# Patient Record
Sex: FEMALE | Race: ASIAN | NOT HISPANIC OR LATINO
[De-identification: names, ages, dates, MRNs, and addresses within clinical notes are randomized per-mention and may not be internally consistent; named-entity substitution may affect disease eponyms.]

---

## 2022-12-21 ENCOUNTER — NON-APPOINTMENT (OUTPATIENT)
Age: 32
End: 2022-12-21

## 2024-03-04 ENCOUNTER — TRANSCRIPTION ENCOUNTER (OUTPATIENT)
Age: 34
End: 2024-03-04

## 2024-03-04 ENCOUNTER — APPOINTMENT (OUTPATIENT)
Dept: OBGYN | Facility: CLINIC | Age: 34
End: 2024-03-04
Payer: COMMERCIAL

## 2024-03-04 ENCOUNTER — ASOB RESULT (OUTPATIENT)
Age: 34
End: 2024-03-04

## 2024-03-04 VITALS
BODY MASS INDEX: 28.34 KG/M2 | RESPIRATION RATE: 16 BRPM | SYSTOLIC BLOOD PRESSURE: 116 MMHG | OXYGEN SATURATION: 97 % | DIASTOLIC BLOOD PRESSURE: 82 MMHG | WEIGHT: 166 LBS | TEMPERATURE: 98.1 F | HEART RATE: 97 BPM | HEIGHT: 64 IN

## 2024-03-04 DIAGNOSIS — E28.2 POLYCYSTIC OVARIAN SYNDROME: ICD-10-CM

## 2024-03-04 DIAGNOSIS — Z01.419 ENCOUNTER FOR GYNECOLOGICAL EXAMINATION (GENERAL) (ROUTINE) W/OUT ABNORMAL FINDINGS: ICD-10-CM

## 2024-03-04 PROBLEM — Z00.00 ENCOUNTER FOR PREVENTIVE HEALTH EXAMINATION: Status: ACTIVE | Noted: 2024-03-04

## 2024-03-04 PROCEDURE — 99385 PREV VISIT NEW AGE 18-39: CPT

## 2024-03-04 PROCEDURE — 76830 TRANSVAGINAL US NON-OB: CPT

## 2024-03-04 PROCEDURE — ZZZZZ: CPT

## 2024-03-04 NOTE — HISTORY OF PRESENT ILLNESS
[FreeTextEntry1] : ESTEFANY MATIAS 34 YO, presents for Annual G 0   LMP: 02/22/24 - Regular menses Sexually active, with same partner No Medication No family history of breast cancer. To do monthly SBE.  History of PCOS x 10 years. Prescribed Metformin but currently not using it. Patient with elevated DHEAS & Testosterone- will be seeing an Endocrinologist.  Trying to conceive for 2 years.  Has not seen any MD about it.  Partner- 38YO, Healthy & nonsmoker.  To get SA & HSG & See me for ROSEMARIE=MD Referral..  For Pelvic sonogram today.

## 2024-03-05 LAB
C TRACH RRNA SPEC QL NAA+PROBE: NOT DETECTED
N GONORRHOEA RRNA SPEC QL NAA+PROBE: NOT DETECTED
SOURCE TP AMPLIFICATION: NORMAL

## 2024-03-07 LAB — CYTOLOGY CVX/VAG DOC THIN PREP: NORMAL

## 2024-03-18 ENCOUNTER — APPOINTMENT (OUTPATIENT)
Dept: OBGYN | Facility: CLINIC | Age: 34
End: 2024-03-18
Payer: COMMERCIAL

## 2024-03-18 PROCEDURE — 99441: CPT

## 2024-10-02 ENCOUNTER — NON-APPOINTMENT (OUTPATIENT)
Age: 34
End: 2024-10-02

## 2024-12-26 ENCOUNTER — APPOINTMENT (OUTPATIENT)
Dept: OBGYN | Facility: CLINIC | Age: 34
End: 2024-12-26
Payer: COMMERCIAL

## 2024-12-26 VITALS
WEIGHT: 149 LBS | SYSTOLIC BLOOD PRESSURE: 126 MMHG | TEMPERATURE: 97.9 F | DIASTOLIC BLOOD PRESSURE: 90 MMHG | BODY MASS INDEX: 25.44 KG/M2 | OXYGEN SATURATION: 99 % | RESPIRATION RATE: 16 BRPM | HEART RATE: 114 BPM | HEIGHT: 64 IN

## 2024-12-26 DIAGNOSIS — Z32.00 ENCOUNTER FOR PREGNANCY TEST, RESULT UNKNOWN: ICD-10-CM

## 2024-12-26 PROCEDURE — 99214 OFFICE O/P EST MOD 30 MIN: CPT

## 2024-12-26 PROCEDURE — 36415 COLL VENOUS BLD VENIPUNCTURE: CPT

## 2024-12-26 RX ORDER — PNV NO.163/IRON/FOLATE NO.10 20 MG-1 MG
20-1 TABLET ORAL
Qty: 30 | Refills: 11 | Status: ACTIVE | COMMUNITY
Start: 2024-12-26 | End: 1900-01-01

## 2024-12-27 LAB
HCG SERPL-MCNC: 354 MIU/ML
PROGEST SERPL-MCNC: 17.2 NG/ML
T GONDII AB SER-IMP: NEGATIVE
T GONDII AB SER-IMP: NEGATIVE
T GONDII IGG SER QL: <3 IU/ML
T GONDII IGM SER QL: <3 AU/ML
TSH SERPL-ACNC: 1.6 UIU/ML

## 2025-01-08 PROBLEM — O36.80X0 PREGNANCY OF UNKNOWN ANATOMIC LOCATION: Status: ACTIVE | Noted: 2025-01-08 | Resolved: 2025-04-08

## 2025-01-09 ENCOUNTER — ASOB RESULT (OUTPATIENT)
Age: 35
End: 2025-01-09

## 2025-01-09 ENCOUNTER — APPOINTMENT (OUTPATIENT)
Dept: OBGYN | Facility: CLINIC | Age: 35
End: 2025-01-09
Payer: COMMERCIAL

## 2025-01-09 VITALS
WEIGHT: 153 LBS | TEMPERATURE: 98 F | HEART RATE: 98 BPM | HEIGHT: 64 IN | SYSTOLIC BLOOD PRESSURE: 116 MMHG | OXYGEN SATURATION: 98 % | DIASTOLIC BLOOD PRESSURE: 80 MMHG | BODY MASS INDEX: 26.12 KG/M2 | RESPIRATION RATE: 16 BRPM

## 2025-01-09 DIAGNOSIS — O20.0 THREATENED ABORTION: ICD-10-CM

## 2025-01-09 PROCEDURE — 99213 OFFICE O/P EST LOW 20 MIN: CPT

## 2025-01-09 PROCEDURE — 76830 TRANSVAGINAL US NON-OB: CPT

## 2025-01-13 ENCOUNTER — APPOINTMENT (OUTPATIENT)
Dept: OBGYN | Facility: CLINIC | Age: 35
End: 2025-01-13

## 2025-01-23 ENCOUNTER — APPOINTMENT (OUTPATIENT)
Dept: OBGYN | Facility: CLINIC | Age: 35
End: 2025-01-23
Payer: COMMERCIAL

## 2025-01-23 ENCOUNTER — ASOB RESULT (OUTPATIENT)
Age: 35
End: 2025-01-23

## 2025-01-23 VITALS
HEIGHT: 64 IN | SYSTOLIC BLOOD PRESSURE: 104 MMHG | TEMPERATURE: 98 F | BODY MASS INDEX: 26.12 KG/M2 | OXYGEN SATURATION: 100 % | RESPIRATION RATE: 16 BRPM | DIASTOLIC BLOOD PRESSURE: 70 MMHG | HEART RATE: 95 BPM | WEIGHT: 153 LBS

## 2025-01-23 DIAGNOSIS — E28.2 POLYCYSTIC OVARIAN SYNDROME: ICD-10-CM

## 2025-01-23 DIAGNOSIS — O24.419 GESTATIONAL DIABETES MELLITUS IN PREGNANCY, UNSPECIFIED CONTROL: ICD-10-CM

## 2025-01-23 DIAGNOSIS — Z3A.08 8 WEEKS GESTATION OF PREGNANCY: ICD-10-CM

## 2025-01-23 PROCEDURE — 76817 TRANSVAGINAL US OBSTETRIC: CPT

## 2025-01-23 PROCEDURE — 99213 OFFICE O/P EST LOW 20 MIN: CPT

## 2025-02-03 ENCOUNTER — ASOB RESULT (OUTPATIENT)
Age: 35
End: 2025-02-03

## 2025-02-03 ENCOUNTER — APPOINTMENT (OUTPATIENT)
Dept: OBGYN | Facility: CLINIC | Age: 35
End: 2025-02-03
Payer: COMMERCIAL

## 2025-02-03 VITALS
WEIGHT: 157 LBS | TEMPERATURE: 98 F | HEIGHT: 64 IN | OXYGEN SATURATION: 97 % | BODY MASS INDEX: 26.8 KG/M2 | DIASTOLIC BLOOD PRESSURE: 74 MMHG | HEART RATE: 105 BPM | SYSTOLIC BLOOD PRESSURE: 106 MMHG | RESPIRATION RATE: 16 BRPM

## 2025-02-03 DIAGNOSIS — O20.0 THREATENED ABORTION: ICD-10-CM

## 2025-02-03 PROCEDURE — 76801 OB US < 14 WKS SINGLE FETUS: CPT

## 2025-02-03 PROCEDURE — 36415 COLL VENOUS BLD VENIPUNCTURE: CPT

## 2025-02-03 PROCEDURE — 99214 OFFICE O/P EST MOD 30 MIN: CPT

## 2025-02-04 LAB
ABO + RH PNL BLD: NORMAL
BLD GP AB SCN SERPL QL: NORMAL
ESTIMATED AVERAGE GLUCOSE: 128 MG/DL
GLUCOSE SERPL-MCNC: 159 MG/DL
HBA1C MFR BLD HPLC: 6.1 %
HBV SURFACE AG SER QL: NONREACTIVE
HCT VFR BLD CALC: 37.6 %
HCV AB SER QL: NONREACTIVE
HCV S/CO RATIO: 0.1 S/CO
HGB A MFR BLD: 97.7 %
HGB A2 MFR BLD: 2.3 %
HGB BLD-MCNC: 11.8 G/DL
HGB FRACT BLD-IMP: NORMAL
HIV1+2 AB SPEC QL IA.RAPID: NONREACTIVE
MCHC RBC-ENTMCNC: 28 PG
MCHC RBC-ENTMCNC: 31.4 G/DL
MCV RBC AUTO: 89.1 FL
MEV IGG FLD QL IA: >300 AU/ML
MEV IGG+IGM SER-IMP: POSITIVE
PLATELET # BLD AUTO: 310 K/UL
RBC # BLD: 4.22 M/UL
RBC # FLD: 14.7 %
RUBV IGG FLD-ACNC: 9.61 INDEX
RUBV IGG SER-IMP: POSITIVE
T PALLIDUM AB SER QL IA: NEGATIVE
WBC # FLD AUTO: 9.84 K/UL

## 2025-02-05 LAB — BACTERIA UR CULT: NORMAL

## 2025-02-10 ENCOUNTER — APPOINTMENT (OUTPATIENT)
Dept: MATERNAL FETAL MEDICINE | Facility: CLINIC | Age: 35
End: 2025-02-10

## 2025-02-10 ENCOUNTER — ASOB RESULT (OUTPATIENT)
Age: 35
End: 2025-02-10

## 2025-02-10 LAB — FMR1 GENE MUT ANL BLD/T: NORMAL

## 2025-02-10 PROCEDURE — 99214 OFFICE O/P EST MOD 30 MIN: CPT | Mod: 95

## 2025-02-10 PROCEDURE — G0108 DIAB MANAGE TRN  PER INDIV: CPT | Mod: 95

## 2025-02-12 LAB
AR GENE MUT ANL BLD/T: NORMAL
CFTR MUT TESTED BLD/T: NEGATIVE

## 2025-02-17 ENCOUNTER — APPOINTMENT (OUTPATIENT)
Dept: OBGYN | Facility: CLINIC | Age: 35
End: 2025-02-17
Payer: COMMERCIAL

## 2025-02-17 ENCOUNTER — ASOB RESULT (OUTPATIENT)
Age: 35
End: 2025-02-17

## 2025-02-17 ENCOUNTER — LABORATORY RESULT (OUTPATIENT)
Age: 35
End: 2025-02-17

## 2025-02-17 VITALS
RESPIRATION RATE: 16 BRPM | HEIGHT: 64 IN | BODY MASS INDEX: 27.31 KG/M2 | DIASTOLIC BLOOD PRESSURE: 76 MMHG | WEIGHT: 160 LBS | HEART RATE: 100 BPM | OXYGEN SATURATION: 97 % | SYSTOLIC BLOOD PRESSURE: 116 MMHG | TEMPERATURE: 98.1 F

## 2025-02-17 DIAGNOSIS — Z34.90 ENCOUNTER FOR SUPERVISION OF NORMAL PREGNANCY, UNSPECIFIED, UNSPECIFIED TRIMESTER: ICD-10-CM

## 2025-02-17 PROCEDURE — 0502F SUBSEQUENT PRENATAL CARE: CPT

## 2025-02-17 PROCEDURE — 36415 COLL VENOUS BLD VENIPUNCTURE: CPT

## 2025-02-17 PROCEDURE — 76801 OB US < 14 WKS SINGLE FETUS: CPT

## 2025-02-17 RX ORDER — ASPIRIN 81 MG/1
81 TABLET, DELAYED RELEASE ORAL DAILY
Qty: 30 | Refills: 8 | Status: ACTIVE | COMMUNITY
Start: 2025-02-17 | End: 1900-01-01

## 2025-02-18 ENCOUNTER — APPOINTMENT (OUTPATIENT)
Dept: MATERNAL FETAL MEDICINE | Facility: CLINIC | Age: 35
End: 2025-02-18

## 2025-02-18 ENCOUNTER — ASOB RESULT (OUTPATIENT)
Age: 35
End: 2025-02-18

## 2025-02-18 DIAGNOSIS — O24.919 UNSPECIFIED DIABETES MELLITUS IN PREGNANCY, UNSPECIFIED TRIMESTER: ICD-10-CM

## 2025-02-18 PROCEDURE — 99204 OFFICE O/P NEW MOD 45 MIN: CPT | Mod: 95

## 2025-02-18 PROCEDURE — G0108 DIAB MANAGE TRN  PER INDIV: CPT | Mod: 95

## 2025-02-19 RX ORDER — PEN NEEDLE, DIABETIC 32GX 5/32"
32G X 4 MM NEEDLE, DISPOSABLE MISCELLANEOUS
Qty: 4 | Refills: 1 | Status: ACTIVE | COMMUNITY
Start: 2025-02-18 | End: 1900-01-01

## 2025-02-19 RX ORDER — INSULIN ASPART 100 [IU]/ML
100 INJECTION, SOLUTION INTRAVENOUS; SUBCUTANEOUS
Qty: 2 | Refills: 1 | Status: ACTIVE | COMMUNITY
Start: 2025-02-18 | End: 1900-01-01

## 2025-02-19 RX ORDER — INSULIN GLARGINE 100 [IU]/ML
100 INJECTION, SOLUTION SUBCUTANEOUS
Qty: 2 | Refills: 2 | Status: ACTIVE | COMMUNITY
Start: 2025-02-18 | End: 1900-01-01

## 2025-02-20 ENCOUNTER — ASOB RESULT (OUTPATIENT)
Age: 35
End: 2025-02-20

## 2025-02-20 ENCOUNTER — APPOINTMENT (OUTPATIENT)
Dept: ANTEPARTUM | Facility: CLINIC | Age: 35
End: 2025-02-20
Payer: COMMERCIAL

## 2025-02-20 PROCEDURE — 76801 OB US < 14 WKS SINGLE FETUS: CPT

## 2025-02-26 ENCOUNTER — NON-APPOINTMENT (OUTPATIENT)
Age: 35
End: 2025-02-26

## 2025-02-27 ENCOUNTER — APPOINTMENT (OUTPATIENT)
Dept: MATERNAL FETAL MEDICINE | Facility: CLINIC | Age: 35
End: 2025-02-27

## 2025-02-27 ENCOUNTER — APPOINTMENT (OUTPATIENT)
Dept: ANTEPARTUM | Facility: CLINIC | Age: 35
End: 2025-02-27
Payer: COMMERCIAL

## 2025-02-27 ENCOUNTER — ASOB RESULT (OUTPATIENT)
Age: 35
End: 2025-02-27

## 2025-02-27 PROCEDURE — 76813 OB US NUCHAL MEAS 1 GEST: CPT

## 2025-02-27 PROCEDURE — 76801 OB US < 14 WKS SINGLE FETUS: CPT

## 2025-03-06 ENCOUNTER — APPOINTMENT (OUTPATIENT)
Dept: MATERNAL FETAL MEDICINE | Facility: CLINIC | Age: 35
End: 2025-03-06

## 2025-03-06 ENCOUNTER — ASOB RESULT (OUTPATIENT)
Age: 35
End: 2025-03-06

## 2025-03-06 ENCOUNTER — APPOINTMENT (OUTPATIENT)
Dept: MATERNAL FETAL MEDICINE | Facility: CLINIC | Age: 35
End: 2025-03-06
Payer: COMMERCIAL

## 2025-03-06 PROCEDURE — 99214 OFFICE O/P EST MOD 30 MIN: CPT | Mod: 95

## 2025-03-06 PROCEDURE — G0108 DIAB MANAGE TRN  PER INDIV: CPT | Mod: 95

## 2025-03-10 ENCOUNTER — APPOINTMENT (OUTPATIENT)
Dept: OBGYN | Facility: CLINIC | Age: 35
End: 2025-03-10
Payer: COMMERCIAL

## 2025-03-10 ENCOUNTER — NON-APPOINTMENT (OUTPATIENT)
Age: 35
End: 2025-03-10

## 2025-03-10 ENCOUNTER — ASOB RESULT (OUTPATIENT)
Age: 35
End: 2025-03-10

## 2025-03-10 VITALS
BODY MASS INDEX: 28.34 KG/M2 | RESPIRATION RATE: 16 BRPM | WEIGHT: 166 LBS | HEIGHT: 64 IN | HEART RATE: 65 BPM | OXYGEN SATURATION: 99 % | TEMPERATURE: 98.1 F | DIASTOLIC BLOOD PRESSURE: 80 MMHG | SYSTOLIC BLOOD PRESSURE: 120 MMHG

## 2025-03-10 VITALS
DIASTOLIC BLOOD PRESSURE: 80 MMHG | SYSTOLIC BLOOD PRESSURE: 120 MMHG | BODY MASS INDEX: 28.34 KG/M2 | TEMPERATURE: 98.2 F | HEART RATE: 111 BPM | HEIGHT: 64 IN | OXYGEN SATURATION: 98 % | RESPIRATION RATE: 16 BRPM | WEIGHT: 166 LBS

## 2025-03-10 PROCEDURE — 76817 TRANSVAGINAL US OBSTETRIC: CPT

## 2025-03-10 PROCEDURE — 0502F SUBSEQUENT PRENATAL CARE: CPT

## 2025-03-14 ENCOUNTER — NON-APPOINTMENT (OUTPATIENT)
Age: 35
End: 2025-03-14

## 2025-03-14 ENCOUNTER — APPOINTMENT (OUTPATIENT)
Dept: OBGYN | Facility: CLINIC | Age: 35
End: 2025-03-14

## 2025-03-20 ENCOUNTER — NON-APPOINTMENT (OUTPATIENT)
Age: 35
End: 2025-03-20

## 2025-03-24 ENCOUNTER — APPOINTMENT (OUTPATIENT)
Dept: OBGYN | Facility: CLINIC | Age: 35
End: 2025-03-24
Payer: COMMERCIAL

## 2025-03-24 ENCOUNTER — APPOINTMENT (OUTPATIENT)
Dept: MATERNAL FETAL MEDICINE | Facility: CLINIC | Age: 35
End: 2025-03-24
Payer: COMMERCIAL

## 2025-03-24 VITALS
BODY MASS INDEX: 30.39 KG/M2 | HEART RATE: 99 BPM | TEMPERATURE: 98 F | WEIGHT: 178 LBS | RESPIRATION RATE: 16 BRPM | DIASTOLIC BLOOD PRESSURE: 77 MMHG | HEIGHT: 64 IN | SYSTOLIC BLOOD PRESSURE: 118 MMHG | OXYGEN SATURATION: 99 %

## 2025-03-24 DIAGNOSIS — Z34.90 ENCOUNTER FOR SUPERVISION OF NORMAL PREGNANCY, UNSPECIFIED, UNSPECIFIED TRIMESTER: ICD-10-CM

## 2025-03-24 PROCEDURE — 99214 OFFICE O/P EST MOD 30 MIN: CPT | Mod: 95

## 2025-03-24 PROCEDURE — 36415 COLL VENOUS BLD VENIPUNCTURE: CPT

## 2025-03-24 PROCEDURE — 0502F SUBSEQUENT PRENATAL CARE: CPT

## 2025-03-24 PROCEDURE — G0108 DIAB MANAGE TRN  PER INDIV: CPT | Mod: 95

## 2025-03-25 LAB
AFP INTERP SERPL-IMP: NORMAL
AFP INTERP SERPL-IMP: NORMAL
AFP MOM CUT-OFF: 1.95
AFP MOM SERPL: 1.16
AFP PERCENTILE: 67.7
AFP SERPL-ACNC: 38.13 NG/ML
CARBAMAZEPINE?: NO
CURRENT SMOKER: NO
DIABETES STATUS PATIENT: YES
GA: NORMAL
GESTATIONAL AGE METHOD: NORMAL
HX OF NTD NARR: NO
MULTIPLE PREGNANCY: NORMAL
NEURAL TUBE DEFECT RISK FETUS: NORMAL
NEURAL TUBE DEFECT RISK POP: NORMAL
RECOM F/U: NO
TEST PERFORMANCE INFO SPEC: NORMAL
VALPROIC ACID?: NO

## 2025-03-26 ENCOUNTER — TRANSCRIPTION ENCOUNTER (OUTPATIENT)
Age: 35
End: 2025-03-26

## 2025-03-27 ENCOUNTER — TRANSCRIPTION ENCOUNTER (OUTPATIENT)
Age: 35
End: 2025-03-27

## 2025-03-27 DIAGNOSIS — R00.0 TACHYCARDIA, UNSPECIFIED: ICD-10-CM

## 2025-03-27 DIAGNOSIS — R00.2 PALPITATIONS: ICD-10-CM

## 2025-03-28 ENCOUNTER — APPOINTMENT (OUTPATIENT)
Dept: ANTEPARTUM | Facility: CLINIC | Age: 35
End: 2025-03-28
Payer: COMMERCIAL

## 2025-03-28 ENCOUNTER — ASOB RESULT (OUTPATIENT)
Age: 35
End: 2025-03-28

## 2025-03-28 PROCEDURE — 76805 OB US >/= 14 WKS SNGL FETUS: CPT

## 2025-04-04 ENCOUNTER — APPOINTMENT (OUTPATIENT)
Age: 35
End: 2025-04-04

## 2025-04-09 ENCOUNTER — ASOB RESULT (OUTPATIENT)
Age: 35
End: 2025-04-09

## 2025-04-09 ENCOUNTER — APPOINTMENT (OUTPATIENT)
Dept: OBGYN | Facility: CLINIC | Age: 35
End: 2025-04-09

## 2025-04-09 ENCOUNTER — APPOINTMENT (OUTPATIENT)
Dept: MATERNAL FETAL MEDICINE | Facility: CLINIC | Age: 35
End: 2025-04-09
Payer: COMMERCIAL

## 2025-04-09 VITALS
SYSTOLIC BLOOD PRESSURE: 96 MMHG | WEIGHT: 176 LBS | OXYGEN SATURATION: 98 % | BODY MASS INDEX: 30.05 KG/M2 | HEIGHT: 64 IN | HEART RATE: 103 BPM | DIASTOLIC BLOOD PRESSURE: 68 MMHG | TEMPERATURE: 98.5 F | RESPIRATION RATE: 16 BRPM

## 2025-04-09 DIAGNOSIS — Z87.42 PERSONAL HISTORY OF OTHER DISEASES OF THE FEMALE GENITAL TRACT: ICD-10-CM

## 2025-04-09 DIAGNOSIS — G47.33 OBSTRUCTIVE SLEEP APNEA (ADULT) (PEDIATRIC): ICD-10-CM

## 2025-04-09 DIAGNOSIS — Z87.19 PERSONAL HISTORY OF OTHER DISEASES OF THE DIGESTIVE SYSTEM: ICD-10-CM

## 2025-04-09 DIAGNOSIS — K21.9 GASTRO-ESOPHAGEAL REFLUX DISEASE W/OUT ESOPHAGITIS: ICD-10-CM

## 2025-04-09 DIAGNOSIS — R73.03 PREDIABETES.: ICD-10-CM

## 2025-04-09 PROCEDURE — 0501F PRENATAL FLOW SHEET: CPT

## 2025-04-09 PROCEDURE — G0108 DIAB MANAGE TRN  PER INDIV: CPT | Mod: 95

## 2025-04-09 PROCEDURE — 81002 URINALYSIS NONAUTO W/O SCOPE: CPT

## 2025-04-09 RX ORDER — FAMOTIDINE 20 MG/1
20 TABLET, FILM COATED ORAL
Qty: 60 | Refills: 6 | Status: ACTIVE | COMMUNITY
Start: 2025-04-09 | End: 1900-01-01

## 2025-04-09 RX ORDER — BLOOD-GLUCOSE SENSOR
EACH MISCELLANEOUS
Qty: 6 | Refills: 3 | Status: ACTIVE | COMMUNITY
Start: 2025-04-09 | End: 1900-01-01

## 2025-04-11 LAB
BILIRUB UR QL STRIP: NEGATIVE
CLARITY UR: CLEAR
COLLECTION METHOD: NORMAL
GLUCOSE UR-MCNC: NEGATIVE
HCG UR QL: 0.2 EU/DL
HGB UR QL STRIP.AUTO: NEGATIVE
KETONES UR-MCNC: NEGATIVE
LEUKOCYTE ESTERASE UR QL STRIP: NEGATIVE
NITRITE UR QL STRIP: NEGATIVE
PH UR STRIP: 5.5
PROT UR STRIP-MCNC: NEGATIVE
SP GR UR STRIP: 1.02

## 2025-04-14 ENCOUNTER — APPOINTMENT (OUTPATIENT)
Dept: OBGYN | Facility: CLINIC | Age: 35
End: 2025-04-14

## 2025-04-16 ENCOUNTER — APPOINTMENT (OUTPATIENT)
Dept: OBGYN | Facility: CLINIC | Age: 35
End: 2025-04-16

## 2025-04-18 ENCOUNTER — ASOB RESULT (OUTPATIENT)
Age: 35
End: 2025-04-18

## 2025-04-18 ENCOUNTER — OUTPATIENT (OUTPATIENT)
Dept: INPATIENT UNIT | Facility: HOSPITAL | Age: 35
LOS: 1 days | End: 2025-04-18
Payer: COMMERCIAL

## 2025-04-18 ENCOUNTER — APPOINTMENT (OUTPATIENT)
Dept: ANTEPARTUM | Facility: CLINIC | Age: 35
End: 2025-04-18

## 2025-04-18 ENCOUNTER — EMERGENCY (EMERGENCY)
Facility: HOSPITAL | Age: 35
LOS: 1 days | End: 2025-04-18
Admitting: EMERGENCY MEDICINE

## 2025-04-18 VITALS
RESPIRATION RATE: 16 BRPM | DIASTOLIC BLOOD PRESSURE: 80 MMHG | SYSTOLIC BLOOD PRESSURE: 138 MMHG | HEART RATE: 128 BPM | OXYGEN SATURATION: 97 % | TEMPERATURE: 99 F

## 2025-04-18 VITALS
RESPIRATION RATE: 16 BRPM | SYSTOLIC BLOOD PRESSURE: 144 MMHG | DIASTOLIC BLOOD PRESSURE: 83 MMHG | TEMPERATURE: 99 F | OXYGEN SATURATION: 100 % | HEART RATE: 111 BPM

## 2025-04-18 VITALS — HEART RATE: 104 BPM | OXYGEN SATURATION: 85 %

## 2025-04-18 DIAGNOSIS — O26.899 OTHER SPECIFIED PREGNANCY RELATED CONDITIONS, UNSPECIFIED TRIMESTER: ICD-10-CM

## 2025-04-18 LAB
ALBUMIN SERPL ELPH-MCNC: 3.3 G/DL — SIGNIFICANT CHANGE UP (ref 3.3–5)
ALP SERPL-CCNC: 71 U/L — SIGNIFICANT CHANGE UP (ref 40–120)
ALT FLD-CCNC: 20 U/L — SIGNIFICANT CHANGE UP (ref 4–33)
ANION GAP SERPL CALC-SCNC: 10 MMOL/L — SIGNIFICANT CHANGE UP (ref 7–14)
APPEARANCE UR: CLEAR — SIGNIFICANT CHANGE UP
AST SERPL-CCNC: 17 U/L — SIGNIFICANT CHANGE UP (ref 4–32)
BASOPHILS # BLD AUTO: 0.03 K/UL — SIGNIFICANT CHANGE UP (ref 0–0.2)
BASOPHILS NFR BLD AUTO: 0.2 % — SIGNIFICANT CHANGE UP (ref 0–2)
BILIRUB SERPL-MCNC: <0.2 MG/DL — SIGNIFICANT CHANGE UP (ref 0.2–1.2)
BILIRUB UR-MCNC: NEGATIVE — SIGNIFICANT CHANGE UP
BUN SERPL-MCNC: 11 MG/DL — SIGNIFICANT CHANGE UP (ref 7–23)
CALCIUM SERPL-MCNC: 9.1 MG/DL — SIGNIFICANT CHANGE UP (ref 8.4–10.5)
CHLORIDE SERPL-SCNC: 101 MMOL/L — SIGNIFICANT CHANGE UP (ref 98–107)
CO2 SERPL-SCNC: 23 MMOL/L — SIGNIFICANT CHANGE UP (ref 22–31)
COLOR SPEC: YELLOW — SIGNIFICANT CHANGE UP
CREAT ?TM UR-MCNC: 86 MG/DL — SIGNIFICANT CHANGE UP
CREAT SERPL-MCNC: 0.41 MG/DL — LOW (ref 0.5–1.3)
DIFF PNL FLD: NEGATIVE — SIGNIFICANT CHANGE UP
EGFR: 131 ML/MIN/1.73M2 — SIGNIFICANT CHANGE UP
EGFR: 131 ML/MIN/1.73M2 — SIGNIFICANT CHANGE UP
EOSINOPHIL # BLD AUTO: 0.15 K/UL — SIGNIFICANT CHANGE UP (ref 0–0.5)
EOSINOPHIL NFR BLD AUTO: 1.2 % — SIGNIFICANT CHANGE UP (ref 0–6)
GLUCOSE SERPL-MCNC: 79 MG/DL — SIGNIFICANT CHANGE UP (ref 70–99)
GLUCOSE UR QL: >=1000 MG/DL
HCT VFR BLD CALC: 35.5 % — SIGNIFICANT CHANGE UP (ref 34.5–45)
HGB BLD-MCNC: 11.4 G/DL — LOW (ref 11.5–15.5)
IANC: 9.38 K/UL — HIGH (ref 1.8–7.4)
IMM GRANULOCYTES NFR BLD AUTO: 1 % — HIGH (ref 0–0.9)
KETONES UR-MCNC: NEGATIVE MG/DL — SIGNIFICANT CHANGE UP
LDH SERPL L TO P-CCNC: 166 U/L — SIGNIFICANT CHANGE UP (ref 135–225)
LEUKOCYTE ESTERASE UR-ACNC: NEGATIVE — SIGNIFICANT CHANGE UP
LYMPHOCYTES # BLD AUTO: 16.1 % — SIGNIFICANT CHANGE UP (ref 13–44)
LYMPHOCYTES # BLD AUTO: 2.05 K/UL — SIGNIFICANT CHANGE UP (ref 1–3.3)
MCHC RBC-ENTMCNC: 28.4 PG — SIGNIFICANT CHANGE UP (ref 27–34)
MCHC RBC-ENTMCNC: 32.1 G/DL — SIGNIFICANT CHANGE UP (ref 32–36)
MCV RBC AUTO: 88.5 FL — SIGNIFICANT CHANGE UP (ref 80–100)
MONOCYTES # BLD AUTO: 0.96 K/UL — HIGH (ref 0–0.9)
MONOCYTES NFR BLD AUTO: 7.6 % — SIGNIFICANT CHANGE UP (ref 2–14)
NEUTROPHILS # BLD AUTO: 9.38 K/UL — HIGH (ref 1.8–7.4)
NEUTROPHILS NFR BLD AUTO: 73.9 % — SIGNIFICANT CHANGE UP (ref 43–77)
NITRITE UR-MCNC: NEGATIVE — SIGNIFICANT CHANGE UP
NRBC # BLD AUTO: 0 K/UL — SIGNIFICANT CHANGE UP (ref 0–0)
NRBC # FLD: 0 K/UL — SIGNIFICANT CHANGE UP (ref 0–0)
NRBC BLD AUTO-RTO: 0 /100 WBCS — SIGNIFICANT CHANGE UP (ref 0–0)
PH UR: 7 — SIGNIFICANT CHANGE UP (ref 5–8)
PLATELET # BLD AUTO: 295 K/UL — SIGNIFICANT CHANGE UP (ref 150–400)
POTASSIUM SERPL-MCNC: 3.9 MMOL/L — SIGNIFICANT CHANGE UP (ref 3.5–5.3)
POTASSIUM SERPL-SCNC: 3.9 MMOL/L — SIGNIFICANT CHANGE UP (ref 3.5–5.3)
PROT ?TM UR-MCNC: 8 MG/DL — SIGNIFICANT CHANGE UP
PROT SERPL-MCNC: 7.2 G/DL — SIGNIFICANT CHANGE UP (ref 6–8.3)
PROT UR-MCNC: NEGATIVE MG/DL — SIGNIFICANT CHANGE UP
PROT/CREAT UR-RTO: 0.1 RATIO — SIGNIFICANT CHANGE UP (ref 0–0.2)
RBC # BLD: 4.01 M/UL — SIGNIFICANT CHANGE UP (ref 3.8–5.2)
RBC # FLD: 13.6 % — SIGNIFICANT CHANGE UP (ref 10.3–14.5)
SODIUM SERPL-SCNC: 134 MMOL/L — LOW (ref 135–145)
SP GR SPEC: 1.02 — SIGNIFICANT CHANGE UP (ref 1–1.03)
URATE SERPL-MCNC: 3.4 MG/DL — SIGNIFICANT CHANGE UP (ref 2.5–7)
UROBILINOGEN FLD QL: 0.2 MG/DL — SIGNIFICANT CHANGE UP (ref 0.2–1)
WBC # BLD: 12.7 K/UL — HIGH (ref 3.8–10.5)
WBC # FLD AUTO: 12.7 K/UL — HIGH (ref 3.8–10.5)

## 2025-04-18 PROCEDURE — 76815 OB US LIMITED FETUS(S): CPT | Mod: 26

## 2025-04-18 PROCEDURE — L9996: CPT

## 2025-04-18 PROCEDURE — 93010 ELECTROCARDIOGRAM REPORT: CPT

## 2025-04-18 PROCEDURE — 93010 ELECTROCARDIOGRAM REPORT: CPT | Mod: 76

## 2025-04-18 RX ORDER — PRENATAL 136/IRON/FOLIC ACID 27 MG-1 MG
0 TABLET ORAL
Refills: 0 | DISCHARGE

## 2025-04-18 RX ORDER — ACETAMINOPHEN 500 MG/5ML
1000 LIQUID (ML) ORAL ONCE
Refills: 0 | Status: COMPLETED | OUTPATIENT
Start: 2025-04-18 | End: 2025-04-18

## 2025-04-18 RX ORDER — INSULIN ASPART 100 [IU]/ML
22 INJECTION, SOLUTION INTRAVENOUS; SUBCUTANEOUS
Refills: 0 | DISCHARGE

## 2025-04-18 RX ORDER — INSULIN GLARGINE-YFGN 100 [IU]/ML
40 INJECTION, SOLUTION SUBCUTANEOUS
Refills: 0 | DISCHARGE

## 2025-04-18 RX ADMIN — Medication 400 MILLIGRAM(S): at 13:03

## 2025-04-18 NOTE — OB PROVIDER TRIAGE NOTE - ADDITIONAL INSTRUCTIONS
Return for contractions, leaking of fluid, vaginal bleeding, decreased fetal movement  Monitor BP daily, keep log and bring to ob appointment   Start 24 hour urine   oral hydration encouraged  follow up with OB at next scheduled appointment

## 2025-04-18 NOTE — OB RN TRIAGE NOTE - TERM DELIVERIES, OB PROFILE
0
MSK: atraumatic right fourth digit, diffuse swelling along the right fourth digit, with swelling, TTP along the entire right fourth digit

## 2025-04-18 NOTE — OB PROVIDER TRIAGE NOTE - PLAN OF CARE
Fetal and maternal status reassuring  no evidence of PEC At this time   HELLP WNL   Patient headache resolved with Tylenol  Discussed with Dr. Wright   -Patient to monitor bp daily   -To start 24 hour urine     PTL precautions reviewed  PEC signs and symptoms reviewed  oral hydration encouraged  follow up with Dr. Bojorquez at next scheduled appointment     discharged at: Fetal and maternal status reassuring  no evidence of PEC At this time   HELLP WNL   Patient headache resolved with Tylenol  Discussed with Dr. Wright   -Patient to monitor bp daily   -To start 24 hour urine     PTL precautions reviewed  PEC signs and symptoms reviewed  oral hydration encouraged  follow up with Dr. Bojorquez at next scheduled appointment     discharged at: [id number]

## 2025-04-18 NOTE — OB RN TRIAGE NOTE - NSICDXPASTMEDICALHX_GEN_ALL_CORE_FT
PAST MEDICAL HISTORY:  Bulging of cervical intervertebral disc     Diabetes type 2     Fibroid, uterine     GERD (gastroesophageal reflux disease)     H/O sinus tachycardia     PCOS (polycystic ovarian syndrome)

## 2025-04-18 NOTE — OB PROVIDER TRIAGE NOTE - NSOBPROVIDERNOTE_OBGYN_ALL_OB_FT
[facility] labs ordered  EKG for palpitations  IV tylenol for headache  bp monitoring in progress  will continue to monitor HELLP labs ordered  EKG for palpitations  IV tylenol for headache  bp monitoring in progress  will continue to monitor    1353: Fetal and maternal status reassuring   Patient states her headache has resolved with tylenol  -HELLP WNL  -EKG normal sinus rhythm   -Patient discussed with Dr. Wright, approved to be discharged home at this time

## 2025-04-18 NOTE — OB PROVIDER TRIAGE NOTE - HISTORY OF PRESENT ILLNESS
PNC: Dr. Bojorquez     PNC: Dr. Bojorquez    34 year old  at 20.4 presents to triage with complaints of a pulsating shooting headache in the back of her head that started this am. Patient reports having a headache that started last night and was progressively worse this am. Patient also reports having dizziness and blurry vision with the headache. Currently states the headache is 6/10 and is not as intense as it was this am. Patient states that she took her bp this am and it was 140/91 and 136/87, denies any history of elevated bp. Patient has history of type 2DM on insulin, reports having tachycardia throughout pregnancy and is scheduled with ob cardiology in May, reports feeling palpitations this am.   -Denies contractions and cramping, leaking of fluid, vaginal bleeding, reports feeling fetal movement but states she felt less movement today.  PNC: Dr. Bojorquez    34 year old  at 20.4 presents to triage with complaints of a pulsating shooting headache in the back of her head that started this am. Patient reports having a headache that started last night and was progressively worse this am. Patient also reports having dizziness and blurry vision with the headache. Currently states the headache is 6/10 and is not as intense as it was this am. Patient states that she took her bp this am and it was 140/91 and 136/87, denies any history of elevated bp. Also reports having bilateral ankle swelling. Patient has history of type 2DM on insulin, reports having tachycardia throughout pregnancy and is scheduled with ob cardiology in May, reports feeling palpitations this am.   -Denies contractions and cramping, leaking of fluid, vaginal bleeding, reports feeling fetal movement but states she felt less movement today.   -Hx: Migraines in past no aura  PNC: Dr. Bojorquez    34 year old  at 20.4 presents to triage with complaints of a pulsating shooting headache in the back of her head that started this am. Patient reports having a headache that started last night and was progressively worse this am. Patient also reports having dizziness and blurry vision with the headache. Currently states the headache is 6/10 and is not as intense as it was this am. Patient states that she took her bp this am and it was 140/91 and 136/87, denies any history of elevated bp. Also reports having bilateral ankle swelling. Patient has history of type 2DM on insulin, reports having tachycardia throughout pregnancy and is scheduled with ob cardiology in May, reports feeling palpitations this am.   -Denies contractions and cramping, leaking of fluid, vaginal bleeding, reports feeling fetal movement but states she felt less movement today.     -Hx: Migraines in past no aura, Type 2DM, GERD, Stomach Ulcer, tachycardia, Anxiety  -MedS: Glargine insulin 40u QHS, Novolog 22-24units with meals, PNV, Pepcid

## 2025-04-18 NOTE — ED ADULT TRIAGE NOTE - CHIEF COMPLAINT QUOTE
pt ambulatory, 20 weeks 5 days pregnant, , GARCÍA 2025 follows with Dr. Bojorquez - c/o elevated BP at home with lower extremity swelling and headaches. +fetal movement. denies vaginal bleeding. FS: 171. hx: tachycardia, DM2  Per Marjan in L&D pt to be sent to L&D for further evaluation

## 2025-04-18 NOTE — OB PROVIDER TRIAGE NOTE - NSPRIMARYCAREPROV_OBGYN_ALL_OB
Patient reminded he is not sterile until he turns in negative sperm count in 6 AND 12 weeks  Patient to continue method of birth control until cleared  MD//MICHAELA/RORO

## 2025-04-18 NOTE — OB PROVIDER TRIAGE NOTE - NSHPPHYSICALEXAM_GEN_ALL_CORE
Vital Signs Last 24 Hrs  T(C): 37.1 (18 Apr 2025 11:23), Max: 37.4 (18 Apr 2025 10:13)  T(F): 98.8 (18 Apr 2025 11:23), Max: 99.3 (18 Apr 2025 10:13)  HR: 94 (18 Apr 2025 13:21) (94 - 128)  BP: 93/58 (18 Apr 2025 13:12) (93/58 - 144/83)  BP(mean): --  RR: 16 (18 Apr 2025 11:23) (16 - 16)  SpO2: 95% (18 Apr 2025 13:21) (92% - 100%)    Assessment reveals VSS  General: Female sitting comfortably in no apparent distress  Neuro: No facial asymmetry, no slurred speech, moves all 4 extremities  Mood: Alert and lucid, appropriate mood and affect  A&Ox3  Lungs- clear bilateral  Heart- normal rate and regular rhythm  Extremities- Warm, Dry, no edema present, good pulses   Abdomen soft, NT, gravid  Transabdominal Ultrasound-breech, anterior placenta, m mode 147, MVP 5.70, good fetal movement noted- images saved ASOB

## 2025-04-18 NOTE — OB RN TRIAGE NOTE - BP NONINVASIVE DIASTOLIC (MM HG)
----- Message from Barry Warren sent at 7/5/2024 11:52 AM CDT -----  Regarding: Instructions  Contact: [phone]  Abner from [facility]  calling to speak with someone in provider office regarding instructions for levothyroxine (SYNTHROID) 137 MCG Tab tablet . Please call back at  [phone]   83

## 2025-04-18 NOTE — OB RN TRIAGE NOTE - NSOBDISCHARGEVS_OBGYN_ALL_OB
Airway patent, Nasal mucosa clear. Mouth with normal mucosa. Throat has no vesicles, no oropharyngeal exudates and uvula is midline. Confirmed that patient received an additional set of vital signs prior to discharge.

## 2025-04-18 NOTE — OB PROVIDER TRIAGE NOTE - NS_OBGYNHISTORY_OBGYN_ALL_OB_FT
denies AP course complicated by type 2DM   denies AP course complicated by type 2DM   OB: denies  GYN:

## 2025-04-18 NOTE — OB PROVIDER TRIAGE NOTE - NSGENETICTESTING_OBGYN_ALL_OB
Yes [Alert] : alert [Normocephalic] : normocephalic [Flat Open Anterior Delaplane] : flat open anterior fontanelle [PERRL] : PERRL [Red Reflex Bilateral] : red reflex bilateral [Normally Placed Ears] : normally placed ears [Auricles Well Formed] : auricles well formed [Clear Tympanic membranes] : clear tympanic membranes [Light reflex present] : light reflex present [Bony landmarks visible] : bony landmarks visible [Nares Patent] : nares patent [Palate Intact] : palate intact [Uvula Midline] : uvula midline [Supple, full passive range of motion] : supple, full passive range of motion [Symmetric Chest Rise] : symmetric chest rise [Clear to Auscultation Bilaterally] : clear to auscultation bilaterally [Regular Rate and Rhythm] : regular rate and rhythm [S1, S2 present] : S1, S2 present [+2 Femoral Pulses] : +2 femoral pulses [Soft] : soft [Bowel Sounds] : bowel sounds present [Normal external genitailia] : normal external genitalia [Patent Vagina] : vagina patent [Normally Placed] : normally placed [No Abnormal Lymph Nodes Palpated] : no abnormal lymph nodes palpated [Symmetric Flexed Extremities] : symmetric flexed extremities [Startle Reflex] : startle reflex present [Suck Reflex] : suck reflex present [Rooting] : rooting reflex present [Palmar Grasp] : palmar grasp reflex present [Plantar Grasp] : plantar grasp reflex present [Symmetric Brynn] : symmetric [place] [Acute Distress] : no acute distress [Discharge] : no discharge [Palpable Masses] : no palpable masses [Murmurs] : no murmurs [Tender] : nontender [Distended] : not distended [Hepatomegaly] : no hepatomegaly [Splenomegaly] : no splenomegaly [Clitoromegaly] : no clitoromegaly [Acldwell-Ortolani] : negative Caldwell-Ortolani [Spinal Dimple] : no spinal dimple [Tuft of Hair] : no tuft of hair [Rash and/or lesion present] : no rash/lesion

## 2025-04-18 NOTE — OB PROVIDER TRIAGE NOTE - NSICDXPASTMEDICALHX_GEN_ALL_CORE_FT
PAST MEDICAL HISTORY:  Bulging of cervical intervertebral disc     Diabetes type 2     Fibroid, uterine     GERD (gastroesophageal reflux disease)     H/O sinus tachycardia     PCOS (polycystic ovarian syndrome)      PAST MEDICAL HISTORY:  Bulging of cervical intervertebral disc     Diabetes type 2     Fibroid, uterine     GERD (gastroesophageal reflux disease)     H/O sinus tachycardia     Migraines     PCOS (polycystic ovarian syndrome)

## 2025-04-18 NOTE — OB RN TRIAGE NOTE - CHIEF COMPLAINT QUOTE
sent from ED, presented with complaint of ankle swelling, HA 9/10, blurred vision, heart flutters, and elevated BP. Pt checked BP at home 140/95

## 2025-04-21 ENCOUNTER — NON-APPOINTMENT (OUTPATIENT)
Age: 35
End: 2025-04-21

## 2025-04-21 DIAGNOSIS — Z3A.20 20 WEEKS GESTATION OF PREGNANCY: ICD-10-CM

## 2025-04-21 DIAGNOSIS — O99.342 OTHER MENTAL DISORDERS COMPLICATING PREGNANCY, SECOND TRIMESTER: ICD-10-CM

## 2025-04-21 DIAGNOSIS — O26.892 OTHER SPECIFIED PREGNANCY RELATED CONDITIONS, SECOND TRIMESTER: ICD-10-CM

## 2025-04-21 DIAGNOSIS — M79.89 OTHER SPECIFIED SOFT TISSUE DISORDERS: ICD-10-CM

## 2025-04-21 DIAGNOSIS — R00.0 TACHYCARDIA, UNSPECIFIED: ICD-10-CM

## 2025-04-21 DIAGNOSIS — R42 DIZZINESS AND GIDDINESS: ICD-10-CM

## 2025-04-21 DIAGNOSIS — O24.112 PRE-EXISTING TYPE 2 DIABETES MELLITUS, IN PREGNANCY, SECOND TRIMESTER: ICD-10-CM

## 2025-04-21 DIAGNOSIS — O99.352 DISEASES OF THE NERVOUS SYSTEM COMPLICATING PREGNANCY, SECOND TRIMESTER: ICD-10-CM

## 2025-04-21 DIAGNOSIS — G43.909 MIGRAINE, UNSPECIFIED, NOT INTRACTABLE, WITHOUT STATUS MIGRAINOSUS: ICD-10-CM

## 2025-04-21 DIAGNOSIS — O99.891 OTHER SPECIFIED DISEASES AND CONDITIONS COMPLICATING PREGNANCY: ICD-10-CM

## 2025-04-21 DIAGNOSIS — H53.8 OTHER VISUAL DISTURBANCES: ICD-10-CM

## 2025-04-21 DIAGNOSIS — D25.9 LEIOMYOMA OF UTERUS, UNSPECIFIED: ICD-10-CM

## 2025-04-21 DIAGNOSIS — O99.282 ENDOCRINE, NUTRITIONAL AND METABOLIC DISEASES COMPLICATING PREGNANCY, SECOND TRIMESTER: ICD-10-CM

## 2025-04-21 DIAGNOSIS — E28.2 POLYCYSTIC OVARIAN SYNDROME: ICD-10-CM

## 2025-04-21 DIAGNOSIS — O34.12 MATERNAL CARE FOR BENIGN TUMOR OF CORPUS UTERI, SECOND TRIMESTER: ICD-10-CM

## 2025-04-21 DIAGNOSIS — R51.9 HEADACHE, UNSPECIFIED: ICD-10-CM

## 2025-04-21 DIAGNOSIS — O09.512 SUPERVISION OF ELDERLY PRIMIGRAVIDA, SECOND TRIMESTER: ICD-10-CM

## 2025-04-22 ENCOUNTER — NON-APPOINTMENT (OUTPATIENT)
Age: 35
End: 2025-04-22

## 2025-04-22 ENCOUNTER — APPOINTMENT (OUTPATIENT)
Dept: CARDIOLOGY | Facility: CLINIC | Age: 35
End: 2025-04-22
Payer: COMMERCIAL

## 2025-04-22 ENCOUNTER — APPOINTMENT (OUTPATIENT)
Dept: OBGYN | Facility: CLINIC | Age: 35
End: 2025-04-22
Payer: COMMERCIAL

## 2025-04-22 VITALS
HEIGHT: 64 IN | WEIGHT: 181 LBS | HEART RATE: 111 BPM | DIASTOLIC BLOOD PRESSURE: 78 MMHG | SYSTOLIC BLOOD PRESSURE: 113 MMHG | BODY MASS INDEX: 30.9 KG/M2

## 2025-04-22 VITALS
HEART RATE: 104 BPM | WEIGHT: 182 LBS | SYSTOLIC BLOOD PRESSURE: 118 MMHG | TEMPERATURE: 98.5 F | OXYGEN SATURATION: 98 % | DIASTOLIC BLOOD PRESSURE: 82 MMHG | HEIGHT: 64 IN | BODY MASS INDEX: 31.07 KG/M2

## 2025-04-22 DIAGNOSIS — Z79.4 TYPE 2 DIABETES MELLITUS W/OUT COMPLICATIONS: ICD-10-CM

## 2025-04-22 DIAGNOSIS — Z83.3 FAMILY HISTORY OF DIABETES MELLITUS: ICD-10-CM

## 2025-04-22 DIAGNOSIS — Z82.49 FAMILY HISTORY OF ISCHEMIC HEART DISEASE AND OTHER DISEASES OF THE CIRCULATORY SYSTEM: ICD-10-CM

## 2025-04-22 DIAGNOSIS — R00.2 PALPITATIONS: ICD-10-CM

## 2025-04-22 DIAGNOSIS — Z83.438 FAMILY HISTORY OF OTHER DISORDER OF LIPOPROTEIN METABOLISM AND OTHER LIPIDEMIA: ICD-10-CM

## 2025-04-22 DIAGNOSIS — O13.2 GESTATIONAL [PREGNANCY-INDUCED] HYPERTENSION W/OUT SIGNIFICANT PROTEINURIA, SECOND TRIMESTER: ICD-10-CM

## 2025-04-22 DIAGNOSIS — E11.9 TYPE 2 DIABETES MELLITUS W/OUT COMPLICATIONS: ICD-10-CM

## 2025-04-22 PROBLEM — M50.30 OTHER CERVICAL DISC DEGENERATION, UNSPECIFIED CERVICAL REGION: Chronic | Status: ACTIVE | Noted: 2025-04-18

## 2025-04-22 PROBLEM — Z86.79 PERSONAL HISTORY OF OTHER DISEASES OF THE CIRCULATORY SYSTEM: Chronic | Status: ACTIVE | Noted: 2025-04-18

## 2025-04-22 PROCEDURE — 93246 EXT ECG>7D<15D RECORDING: CPT

## 2025-04-22 PROCEDURE — 0500F INITIAL PRENATAL CARE VISIT: CPT

## 2025-04-22 PROCEDURE — 99214 OFFICE O/P EST MOD 30 MIN: CPT

## 2025-04-22 PROCEDURE — 99205 OFFICE O/P NEW HI 60 MIN: CPT

## 2025-04-22 PROCEDURE — 93000 ELECTROCARDIOGRAM COMPLETE: CPT | Mod: 59

## 2025-04-22 RX ORDER — PRENATAL VIT 49/IRON FUM/FOLIC 6.75-0.2MG
TABLET ORAL
Refills: 0 | Status: ACTIVE | COMMUNITY

## 2025-04-23 LAB
CREAT 24H UR-MCNC: 0.8 G/24 H
CREAT ?TM UR-MCNC: 60 MG/DL
PROT 24H UR-MRATE: 6 MG/DL
PROT ?TM UR-MCNC: 24 HR
PROT UR-MCNC: 84 MG/24 H
SPECIMEN VOL 24H UR: 1400 ML

## 2025-04-25 ENCOUNTER — INPATIENT (INPATIENT)
Facility: HOSPITAL | Age: 35
LOS: 1 days | Discharge: HOME | End: 2025-04-27
Attending: OBSTETRICS & GYNECOLOGY | Admitting: OBSTETRICS & GYNECOLOGY
Payer: COMMERCIAL

## 2025-04-25 ENCOUNTER — APPOINTMENT (OUTPATIENT)
Dept: ANTEPARTUM | Facility: CLINIC | Age: 35
End: 2025-04-25

## 2025-04-25 ENCOUNTER — APPOINTMENT (OUTPATIENT)
Dept: ANTEPARTUM | Facility: CLINIC | Age: 35
End: 2025-04-25
Payer: COMMERCIAL

## 2025-04-25 ENCOUNTER — ASOB RESULT (OUTPATIENT)
Age: 35
End: 2025-04-25

## 2025-04-25 VITALS — OXYGEN SATURATION: 98 % | SYSTOLIC BLOOD PRESSURE: 109 MMHG | DIASTOLIC BLOOD PRESSURE: 72 MMHG | HEART RATE: 108 BPM

## 2025-04-25 DIAGNOSIS — O26.899 OTHER SPECIFIED PREGNANCY RELATED CONDITIONS, UNSPECIFIED TRIMESTER: ICD-10-CM

## 2025-04-25 LAB — GLUCOSE BLDC GLUCOMTR-MCNC: 126 MG/DL — HIGH (ref 70–99)

## 2025-04-25 PROCEDURE — 76811 OB US DETAILED SNGL FETUS: CPT

## 2025-04-25 PROCEDURE — 99215 OFFICE O/P EST HI 40 MIN: CPT | Mod: 25

## 2025-04-25 PROCEDURE — 76817 TRANSVAGINAL US OBSTETRIC: CPT

## 2025-04-25 RX ORDER — DEXTROSE 50 % IN WATER 50 %
25 SYRINGE (ML) INTRAVENOUS ONCE
Refills: 0 | Status: DISCONTINUED | OUTPATIENT
Start: 2025-04-25 | End: 2025-04-27

## 2025-04-25 RX ORDER — INSULIN GLARGINE-YFGN 100 [IU]/ML
20 INJECTION, SOLUTION SUBCUTANEOUS AT BEDTIME
Refills: 0 | Status: DISCONTINUED | OUTPATIENT
Start: 2025-04-25 | End: 2025-04-26

## 2025-04-25 RX ORDER — DEXTROSE 50 % IN WATER 50 %
15 SYRINGE (ML) INTRAVENOUS ONCE
Refills: 0 | Status: DISCONTINUED | OUTPATIENT
Start: 2025-04-25 | End: 2025-04-27

## 2025-04-25 RX ORDER — PRENATAL 136/IRON/FOLIC ACID 27 MG-1 MG
1 TABLET ORAL DAILY
Refills: 0 | Status: DISCONTINUED | OUTPATIENT
Start: 2025-04-25 | End: 2025-04-27

## 2025-04-25 RX ORDER — INSULIN LISPRO 100 U/ML
22 INJECTION, SOLUTION INTRAVENOUS; SUBCUTANEOUS
Refills: 0 | Status: DISCONTINUED | OUTPATIENT
Start: 2025-04-25 | End: 2025-04-26

## 2025-04-25 RX ORDER — INSULIN GLARGINE-YFGN 100 [IU]/ML
40 INJECTION, SOLUTION SUBCUTANEOUS AT BEDTIME
Refills: 0 | Status: DISCONTINUED | OUTPATIENT
Start: 2025-04-25 | End: 2025-04-25

## 2025-04-25 RX ORDER — SODIUM CHLORIDE 9 G/1000ML
1000 INJECTION, SOLUTION INTRAVENOUS
Refills: 0 | Status: DISCONTINUED | OUTPATIENT
Start: 2025-04-25 | End: 2025-04-27

## 2025-04-25 RX ORDER — DEXTROSE 50 % IN WATER 50 %
12.5 SYRINGE (ML) INTRAVENOUS ONCE
Refills: 0 | Status: DISCONTINUED | OUTPATIENT
Start: 2025-04-25 | End: 2025-04-27

## 2025-04-25 RX ORDER — GLUCAGON 3 MG/1
1 POWDER NASAL ONCE
Refills: 0 | Status: DISCONTINUED | OUTPATIENT
Start: 2025-04-25 | End: 2025-04-27

## 2025-04-25 NOTE — OB RN PATIENT PROFILE - FALL HARM RISK - UNIVERSAL INTERVENTIONS
Bed in lowest position, wheels locked, appropriate side rails in place/Call bell, personal items and telephone in reach/Instruct patient to call for assistance before getting out of bed or chair/Non-slip footwear when patient is out of bed/[place] to call system/Physically safe environment - no spills, clutter or unnecessary equipment/Purposeful Proactive Rounding/Room/bathroom lighting operational, light cord in reach

## 2025-04-25 NOTE — OB RN PATIENT PROFILE - NS_ADMITVITALS_OBGYN_ALL_OB_DT
Pt VSS, no acute distress noted throughout shift. Pt tolerating regular diet well, no reports of n/v or diarrhea noted. Adequate output. 20 gauge PIV to right forearm and 20 gauge PIV to left anterior arm CDI and saline locked. Parents at bedside attentive to pt and active in care, verbalized understanding of POC. Safety maintained.    25-Apr-2025 23:11

## 2025-04-26 ENCOUNTER — TRANSCRIPTION ENCOUNTER (OUTPATIENT)
Age: 35
End: 2025-04-26

## 2025-04-26 VITALS
SYSTOLIC BLOOD PRESSURE: 116 MMHG | RESPIRATION RATE: 26 BRPM | HEART RATE: 117 BPM | OXYGEN SATURATION: 98 % | DIASTOLIC BLOOD PRESSURE: 74 MMHG

## 2025-04-26 DIAGNOSIS — O34.30 MATERNAL CARE FOR CERVICAL INCOMPETENCE, UNSPECIFIED TRIMESTER: ICD-10-CM

## 2025-04-26 PROBLEM — D25.9 LEIOMYOMA OF UTERUS, UNSPECIFIED: Chronic | Status: ACTIVE | Noted: 2025-04-18

## 2025-04-26 PROBLEM — G43.909 MIGRAINE, UNSPECIFIED, NOT INTRACTABLE, WITHOUT STATUS MIGRAINOSUS: Chronic | Status: ACTIVE | Noted: 2025-04-18

## 2025-04-26 PROBLEM — K21.9 GASTRO-ESOPHAGEAL REFLUX DISEASE WITHOUT ESOPHAGITIS: Chronic | Status: ACTIVE | Noted: 2025-04-18

## 2025-04-26 PROBLEM — E11.9 TYPE 2 DIABETES MELLITUS WITHOUT COMPLICATIONS: Chronic | Status: ACTIVE | Noted: 2025-04-18

## 2025-04-26 LAB
APPEARANCE UR: CLEAR — SIGNIFICANT CHANGE UP
BASOPHILS # BLD AUTO: 0.02 K/UL — SIGNIFICANT CHANGE UP (ref 0–0.2)
BASOPHILS NFR BLD AUTO: 0.2 % — SIGNIFICANT CHANGE UP (ref 0–2)
BILIRUB UR-MCNC: NEGATIVE — SIGNIFICANT CHANGE UP
BLD GP AB SCN SERPL QL: NEGATIVE — SIGNIFICANT CHANGE UP
COLOR SPEC: YELLOW — SIGNIFICANT CHANGE UP
DIFF PNL FLD: NEGATIVE — SIGNIFICANT CHANGE UP
EOSINOPHIL # BLD AUTO: 0.15 K/UL — SIGNIFICANT CHANGE UP (ref 0–0.5)
EOSINOPHIL NFR BLD AUTO: 1.2 % — SIGNIFICANT CHANGE UP (ref 0–6)
GLUCOSE BLDC GLUCOMTR-MCNC: 101 MG/DL — HIGH (ref 70–99)
GLUCOSE BLDC GLUCOMTR-MCNC: 104 MG/DL — HIGH (ref 70–99)
GLUCOSE BLDC GLUCOMTR-MCNC: 113 MG/DL — HIGH (ref 70–99)
GLUCOSE BLDC GLUCOMTR-MCNC: 117 MG/DL — HIGH (ref 70–99)
GLUCOSE BLDC GLUCOMTR-MCNC: 140 MG/DL — HIGH (ref 70–99)
GLUCOSE BLDC GLUCOMTR-MCNC: 141 MG/DL — HIGH (ref 70–99)
GLUCOSE BLDC GLUCOMTR-MCNC: 92 MG/DL — SIGNIFICANT CHANGE UP (ref 70–99)
GLUCOSE UR QL: 100 MG/DL
HCT VFR BLD CALC: 33 % — LOW (ref 34.5–45)
HGB BLD-MCNC: 10.6 G/DL — LOW (ref 11.5–15.5)
IANC: 8.4 K/UL — HIGH (ref 1.8–7.4)
IMM GRANULOCYTES NFR BLD AUTO: 0.7 % — SIGNIFICANT CHANGE UP (ref 0–0.9)
KETONES UR-MCNC: NEGATIVE MG/DL — SIGNIFICANT CHANGE UP
LEUKOCYTE ESTERASE UR-ACNC: NEGATIVE — SIGNIFICANT CHANGE UP
LYMPHOCYTES # BLD AUTO: 2.54 K/UL — SIGNIFICANT CHANGE UP (ref 1–3.3)
LYMPHOCYTES # BLD AUTO: 20.9 % — SIGNIFICANT CHANGE UP (ref 13–44)
MCHC RBC-ENTMCNC: 28.3 PG — SIGNIFICANT CHANGE UP (ref 27–34)
MCHC RBC-ENTMCNC: 32.1 G/DL — SIGNIFICANT CHANGE UP (ref 32–36)
MCV RBC AUTO: 88 FL — SIGNIFICANT CHANGE UP (ref 80–100)
MONOCYTES # BLD AUTO: 0.95 K/UL — HIGH (ref 0–0.9)
MONOCYTES NFR BLD AUTO: 7.8 % — SIGNIFICANT CHANGE UP (ref 2–14)
NEUTROPHILS # BLD AUTO: 8.4 K/UL — HIGH (ref 1.8–7.4)
NEUTROPHILS NFR BLD AUTO: 69.2 % — SIGNIFICANT CHANGE UP (ref 43–77)
NITRITE UR-MCNC: NEGATIVE — SIGNIFICANT CHANGE UP
NRBC # BLD AUTO: 0 K/UL — SIGNIFICANT CHANGE UP (ref 0–0)
NRBC # FLD: 0 K/UL — SIGNIFICANT CHANGE UP (ref 0–0)
NRBC BLD AUTO-RTO: 0 /100 WBCS — SIGNIFICANT CHANGE UP (ref 0–0)
PH UR: 6.5 — SIGNIFICANT CHANGE UP (ref 5–8)
PLATELET # BLD AUTO: 269 K/UL — SIGNIFICANT CHANGE UP (ref 150–400)
PROT UR-MCNC: NEGATIVE MG/DL — SIGNIFICANT CHANGE UP
RBC # BLD: 3.75 M/UL — LOW (ref 3.8–5.2)
RBC # FLD: 13.3 % — SIGNIFICANT CHANGE UP (ref 10.3–14.5)
RH IG SCN BLD-IMP: POSITIVE — SIGNIFICANT CHANGE UP
SP GR SPEC: 1.02 — SIGNIFICANT CHANGE UP (ref 1–1.03)
T PALLIDUM AB TITR SER: NEGATIVE — SIGNIFICANT CHANGE UP
UROBILINOGEN FLD QL: 0.2 MG/DL — SIGNIFICANT CHANGE UP (ref 0.2–1)
WBC # BLD: 12.15 K/UL — HIGH (ref 3.8–10.5)
WBC # FLD AUTO: 12.15 K/UL — HIGH (ref 3.8–10.5)

## 2025-04-26 PROCEDURE — 59320 REVISION OF CERVIX: CPT

## 2025-04-26 PROCEDURE — 99233 SBSQ HOSP IP/OBS HIGH 50: CPT | Mod: GC

## 2025-04-26 RX ORDER — PROGESTERONE 200 MG/1
200 CAPSULE ORAL
Qty: 30 | Refills: 0
Start: 2025-04-26 | End: 2025-05-25

## 2025-04-26 RX ORDER — CITRIC ACID/SODIUM CITRATE 300-500 MG
30 SOLUTION, ORAL ORAL ONCE
Refills: 0 | Status: COMPLETED | OUTPATIENT
Start: 2025-04-26 | End: 2025-04-26

## 2025-04-26 RX ORDER — SODIUM CHLORIDE 9 G/1000ML
1000 INJECTION, SOLUTION INTRAVENOUS
Refills: 0 | Status: DISCONTINUED | OUTPATIENT
Start: 2025-04-26 | End: 2025-04-27

## 2025-04-26 RX ORDER — SODIUM CHLORIDE 9 G/1000ML
1000 INJECTION, SOLUTION INTRAVENOUS
Refills: 0 | Status: COMPLETED | OUTPATIENT
Start: 2025-04-25 | End: 2025-04-26

## 2025-04-26 RX ORDER — INDOMETHACIN 50 MG
50 CAPSULE ORAL ONCE
Refills: 0 | Status: DISCONTINUED | OUTPATIENT
Start: 2025-04-26 | End: 2025-04-26

## 2025-04-26 RX ORDER — INDOMETHACIN 50 MG
50 CAPSULE ORAL ONCE
Refills: 0 | Status: COMPLETED | OUTPATIENT
Start: 2025-04-26 | End: 2025-04-26

## 2025-04-26 RX ORDER — INSULIN GLARGINE-YFGN 100 [IU]/ML
40 INJECTION, SOLUTION SUBCUTANEOUS AT BEDTIME
Refills: 0 | Status: DISCONTINUED | OUTPATIENT
Start: 2025-04-26 | End: 2025-04-27

## 2025-04-26 RX ORDER — INDOMETHACIN 50 MG
25 CAPSULE ORAL ONCE
Refills: 0 | Status: COMPLETED | OUTPATIENT
Start: 2025-04-26 | End: 2025-04-26

## 2025-04-26 RX ORDER — INSULIN LISPRO 100 U/ML
24 INJECTION, SOLUTION INTRAVENOUS; SUBCUTANEOUS
Refills: 0 | Status: DISCONTINUED | OUTPATIENT
Start: 2025-04-26 | End: 2025-04-27

## 2025-04-26 RX ORDER — INDOMETHACIN 50 MG
1 CAPSULE ORAL
Qty: 7 | Refills: 0
Start: 2025-04-26 | End: 2025-04-27

## 2025-04-26 RX ORDER — PROGESTERONE 200 MG/1
1 CAPSULE ORAL
Qty: 1 | Refills: 1
Start: 2025-04-26

## 2025-04-26 RX ADMIN — INSULIN GLARGINE-YFGN 20 UNIT(S): 100 INJECTION, SOLUTION SUBCUTANEOUS at 00:17

## 2025-04-26 RX ADMIN — INSULIN LISPRO 24 UNIT(S): 100 INJECTION, SOLUTION INTRAVENOUS; SUBCUTANEOUS at 18:37

## 2025-04-26 RX ADMIN — SODIUM CHLORIDE 125 MILLILITER(S): 9 INJECTION, SOLUTION INTRAVENOUS at 04:20

## 2025-04-26 RX ADMIN — Medication 30 MILLILITER(S): at 14:46

## 2025-04-26 RX ADMIN — Medication 25 MILLIGRAM(S): at 21:31

## 2025-04-26 RX ADMIN — Medication 20 MILLIGRAM(S): at 14:46

## 2025-04-26 RX ADMIN — Medication 50 MILLIGRAM(S): at 16:42

## 2025-04-26 RX ADMIN — Medication 50 MILLIGRAM(S): at 14:46

## 2025-04-26 RX ADMIN — Medication 25 MILLIGRAM(S): at 20:45

## 2025-04-26 RX ADMIN — SODIUM CHLORIDE 125 MILLILITER(S): 9 INJECTION, SOLUTION INTRAVENOUS at 01:01

## 2025-04-26 NOTE — OB PROVIDER H&P - NSLOWPPHRISK_OBGYN_A_OB
No previous uterine incision/Vigil Pregnancy/Less than or equal to 4 previous vaginal births/No known bleeding disorder

## 2025-04-26 NOTE — DISCHARGE NOTE ANTEPARTUM - PLAN OF CARE
While you were in the hospital, you had a procedure to place a cervical cerclage. The procedure went well and you can safely go home to recover. You and eating/drink, ambulate and void as normal. We recommend pelvic rest (i.e nothing in the vagina) for the remainder of the pregnancy - no pools, tubs, sex, toys etc. You may experience some vaginal bleeding or spotting, please call your doctor if you soak a heavy pad in less than 2 hours. Please call your OBGYN for severe pain or fever >100.4 or you notice malodorous vaginal discharge.     Please take 48 hours of Indocin (sent to to your pharmacy) excluding any doses we may have already given you in the hospital.   You can take Tylenol 650mg every 6 hours for pain as needed. Continue to follow-up with your OBGYN as instructed. Call your doctor or return to the hospital if you experience regular painful contractions, vaginal bleeding, leaking of fluids like your water broke, or if you experience decreased fetal movements. While you were in the hospital, you had a procedure to place a cervical cerclage. The procedure went well and you can safely go home to recover. You and eating/drink, ambulate and void as normal. We recommend pelvic rest (i.e nothing in the vagina) for the remainder of the pregnancy - no pools, tubs, sex, toys etc. You may experience some vaginal bleeding or spotting, please call your doctor if you soak a heavy pad in less than 2 hours. Please call your OBGYN for severe pain or fever >100.4 or you notice malodorous vaginal discharge.     Please take 48 hours of Indocin (sent to to your pharmacy) excluding any doses we may have already given you in the hospital.   Place Vaginal Progesterone 200mg nightly in vagina starting 4/27  You can take Tylenol 650mg every 6 hours for pain as needed.

## 2025-04-26 NOTE — PROGRESS NOTE ADULT - SUBJECTIVE AND OBJECTIVE BOX
R3 Antepartum Note, HD#    Patient seen and examined at bedside, no acute overnight events. No acute complaints. Pt reports +FM, denies LOF, VB, ctx.    Vital Signs Last 24 Hours  T(C): 37.1 (04-26-25 @ 06:28), Max: 37.1 (04-26-25 @ 06:28)  HR: 97 (04-26-25 @ 06:28) (97 - 108)  BP: 113/63 (04-26-25 @ 06:28) (103/54 - 113/63)  RR: 17 (04-26-25 @ 06:28) (16 - 18)  SpO2: 97% (04-26-25 @ 06:28) (97% - 99%)    CAPILLARY BLOOD GLUCOSE      POCT Blood Glucose.: 113 mg/dL (26 Apr 2025 06:54)  POCT Blood Glucose.: 117 mg/dL (26 Apr 2025 03:02)  POCT Blood Glucose.: 126 mg/dL (25 Apr 2025 23:17)      Physical Exam:  General: NAD  Abdomen: Soft, non-tender, gravid  Ext: No pain or swelling        Labs:             10.6   12.15 )-----------( 269      ( 04-26 @ 00:30 )             33.0                   MEDICATIONS  (STANDING):  dextrose 5%. 1000 milliLiter(s) (50 mL/Hr) IV Continuous <Continuous>  dextrose 5%. 1000 milliLiter(s) (100 mL/Hr) IV Continuous <Continuous>  dextrose 50% Injectable 25 Gram(s) IV Push once  dextrose 50% Injectable 12.5 Gram(s) IV Push once  dextrose 50% Injectable 25 Gram(s) IV Push once  glucagon  Injectable 1 milliGRAM(s) IntraMuscular once  insulin glargine Injectable (LANTUS) 20 Unit(s) SubCutaneous at bedtime  lactated ringers. 1000 milliLiter(s) (125 mL/Hr) IV Continuous <Continuous>  prenatal multivitamin 1 Tablet(s) Oral daily    MEDICATIONS  (PRN):  dextrose Oral Gel 15 Gram(s) Oral once PRN Blood Glucose LESS THAN 70 milliGRAM(s)/deciliter  
Patient reports no complaints of contractions or vaginal bleeding. Patient reports unsure if having fetal movements.  Patient is hungry since she has been NPO since midnight.    ICU Vital Signs Last 24 Hrs  T(C): 37.1 (26 Apr 2025 06:28), Max: 37.1 (26 Apr 2025 06:28)  T(F): 98.8 (26 Apr 2025 06:28), Max: 98.8 (26 Apr 2025 06:28)  HR: 103 (26 Apr 2025 10:12) (97 - 108)  BP: 111/65 (26 Apr 2025 10:12) (103/54 - 113/63)  BP(mean): --  ABP: --  ABP(mean): --  RR: 17 (26 Apr 2025 06:28) (16 - 18)  SpO2: 100% (26 Apr 2025 10:12) (97% - 100%)    O2 Parameters below as of 25 Apr 2025 23:28  Patient On (Oxygen Delivery Method): room air                          10.6   12.15 )-----------( 269      ( 26 Apr 2025 00:30 )             33.0

## 2025-04-26 NOTE — DISCHARGE NOTE ANTEPARTUM - NS MD DC FALL RISK RISK
For information on Fall & Injury Prevention, visit: [email or web address] OR  [email or web address] OR  https://www.cdc.gov/steadi/patient.html

## 2025-04-26 NOTE — BRIEF OPERATIVE NOTE - OPERATION/FINDINGS
Patient had exam under anesthesia with 0.5 cm dilated with membranes noted at the level of the internal os.  Gibson cerclage placed after regional anesthesia completed. At end of the procedure, cervix was closed with cerclage in situ with at least 1 cm of cervical length. Hemostatic at end of procedure with clear urine in garcia catheter. Dr. Mcwilliams was gloved and participated in the entire procedure.

## 2025-04-26 NOTE — DISCHARGE NOTE ANTEPARTUM - MEDICATION SUMMARY - MEDICATIONS TO TAKE
I will START or STAY ON the medications listed below when I get home from the hospital:    indomethacin 25 mg oral capsule  -- 1 cap(s) by mouth every 6 hours  -- Indication: For Cerclage    Insulin Glargine  -- 40 unit(s) subcutaneous once a day (at bedtime)  -- Indication: For Home medication    NovoLOG 100 units/mL injectable solution  -- 22 unit(s) injectable 3 times a day (before meals) 22-24 units pre meals  -- Indication: For Home medication    Pepcid 20 mg oral tablet  -- 1 tab(s) orally  -- Indication: For Home medication    multivitamin, prenatal  -- Indication: For Home medication   I will START or STAY ON the medications listed below when I get home from the hospital:    indomethacin 25 mg oral capsule  -- 1 cap(s) by mouth every 6 hours  -- Indication: For Cerclage    Insulin Glargine  -- 40 unit(s) subcutaneous once a day (at bedtime)  -- Indication: For Home medication    NovoLOG 100 units/mL injectable solution  -- 22 unit(s) injectable 3 times a day (before meals) 22-24 units pre meals  -- Indication: For Home medication    Pepcid 20 mg oral tablet  -- 1 tab(s) orally  -- Indication: For Home medication    multivitamin, prenatal  -- Indication: For Home medication    progesterone 200 mg vaginal suppository  -- 1 suppository(ies) intravaginally once a day (at bedtime)  -- Indication: For Short cervix   I will START or STAY ON the medications listed below when I get home from the hospital:    indomethacin 25 mg oral capsule  -- 1 cap(s) by mouth every 6 hours  -- Indication: For Cerclage    Insulin Glargine  -- 40 unit(s) subcutaneous once a day (at bedtime)  -- Indication: For Home medication    NovoLOG 100 units/mL injectable solution  -- 22 unit(s) injectable 3 times a day (before meals) 22-24 units pre meals  -- Indication: For Home medication    Pepcid 20 mg oral tablet  -- 1 tab(s) orally  -- Indication: For Home medication    multivitamin, prenatal  -- Indication: For Home medication    progesterone 200 mg oral capsule  -- 200 milligram(s) intravaginally once a day (at bedtime)  -- Indication: For Short cervix

## 2025-04-26 NOTE — DISCHARGE NOTE ANTEPARTUM - MEDICATION SUMMARY - MEDICATIONS TO CHANGE
I will SWITCH the dose or number of times a day I take the medications listed below when I get home from the hospital:  None Allergic Rx

## 2025-04-26 NOTE — DISCHARGE NOTE ANTEPARTUM - HOSPITAL COURSE
Pt presented from the office with incidental cervical shortening noted on outpatient sonogram.  She underwent uncomplicated placement of Gibson cerclage. Fetal heart rate was checked and appropriate pre and post procedure. She went home after walking and voiding with pain well controlled.

## 2025-04-26 NOTE — DISCHARGE NOTE ANTEPARTUM - FINANCIAL ASSISTANCE
[facility] provides services at a reduced cost to those who are determined to be eligible through [facility]’s financial assistance program. Information regarding [facility]’s financial assistance program can be found by going to [email or web address] or by calling [phone].

## 2025-04-26 NOTE — DISCHARGE NOTE ANTEPARTUM - PATIENT PORTAL LINK FT
You can access the FollowMyHealth Patient Portal offered by [facility] by registering at the following website: [email or web address]. By joining [facility]’s FollowMyHealth portal, you will also be able to view your health information using other applications (apps) compatible with our system.

## 2025-04-26 NOTE — BRIEF OPERATIVE NOTE - NSICDXBRIEFPOSTOP_GEN_ALL_CORE_FT
POST-OP DIAGNOSIS:  Short cervix in second trimester, antepartum 26-Apr-2025 16:03:06  Ness Mcwilliams  Type 2 diabetes mellitus in pregnancy 26-Apr-2025 16:03:18  Ness Mcwilliams

## 2025-04-26 NOTE — DISCHARGE NOTE ANTEPARTUM - NPI NUMBER (FOR SYSADMIN USE ONLY) :
Patient calling regarding L wrist pain. Patient usually gets cortisone injections from MD.      Patient isn't sure if MD will see her in clinic due to COVID-19. Patient also has questions regarding medication.           Can call patient back on cell # [[phone]]

## 2025-04-26 NOTE — PROGRESS NOTE ADULT - ASSESSMENT
34yoF  21w5d presents for cerclage placement. pt incidentally found to have short cervix on anatomy scan.     #short cervix   - pt added on for cerclage in AM  - NPO/NS@125   -UCx, GC/CT ()    #T2DM  - c/w home Lantus QHS (pt given half dose in setting of NPO)  - hold mealtime insulin while NPO   - FS q4 hours while NPO     #possible gHTN  - pending 24h urine   - monitor BPs    DTaveras PGY3    #fetal wellbeing  - FH check on admission and pro/post op  
34 y.o.  at 21w5d  # Incidental short cervix with dilation  # DM2 on insulin  # Sleep apnea    Incidental short cervix: Patient counseled yesterday about cerclage during her anatomy ultrasound. I reviewed the risks, benefits, and alternative of cerclage placement. Patient still desires to have cerclage placed secondary to potential benefit. Patient will need indocin related to the procedure and additional antibiotics may be considered pending cervical dilation at the time of procedure. Anesthesia to see patient today and nursing aware to add patient on for procedure today- OR pending at this time.    DM2: has been NPO with q4h glucose monitoring on sliding scale coverage;  at home on lantus with admelog coverage half of home lantus dose given last night (will need to resume tonight after discharge)    Sleep apnea: aware to need to restart CPAP at home and advised that will improve    Tachycardia, palpitations: has Ziopatch in place from cardiology and reports some palpitations related to procedure but not different than prior

## 2025-04-26 NOTE — BRIEF OPERATIVE NOTE - NSICDXBRIEFPREOP_GEN_ALL_CORE_FT
PRE-OP DIAGNOSIS:  Short cervix affecting pregnancy 26-Apr-2025 16:02:09  Ness Mcwilliams  Pre-existing type 2 diabetes mellitus, in pregnancy, unspecified trimester 26-Apr-2025 16:02:47  Ness Mcwilliams

## 2025-04-26 NOTE — OB PROVIDER H&P - CURRENT PREGNANCY COMPLICATIONS, OB PROFILE
CNs: II: ENRRIQUE, Full visual fields III / IV / VI: T2DM/Incompetent Cervix/Cervical Insufficiency/Maternal Medical Condition/Hypertensive Disorder/Gastrointestinal Disease

## 2025-04-26 NOTE — DISCHARGE NOTE ANTEPARTUM - CARE PLAN
1 Principal Discharge DX:	Gibson cerclage present  Assessment and plan of treatment:	While you were in the hospital, you had a procedure to place a cervical cerclage. The procedure went well and you can safely go home to recover. You and eating/drink, ambulate and void as normal. We recommend pelvic rest (i.e nothing in the vagina) for the remainder of the pregnancy - no pools, tubs, sex, toys etc. You may experience some vaginal bleeding or spotting, please call your doctor if you soak a heavy pad in less than 2 hours. Please call your OBGYN for severe pain or fever >100.4 or you notice malodorous vaginal discharge.     Please take 48 hours of Indocin (sent to to your pharmacy) excluding any doses we may have already given you in the hospital.   You can take Tylenol 650mg every 6 hours for pain as needed.  Secondary Diagnosis:	Single pregnancy, antepartum  Assessment and plan of treatment:	Continue to follow-up with your OBGYN as instructed. Call your doctor or return to the hospital if you experience regular painful contractions, vaginal bleeding, leaking of fluids like your water broke, or if you experience decreased fetal movements.   Principal Discharge DX:	Gibson cerclage present  Assessment and plan of treatment:	While you were in the hospital, you had a procedure to place a cervical cerclage. The procedure went well and you can safely go home to recover. You and eating/drink, ambulate and void as normal. We recommend pelvic rest (i.e nothing in the vagina) for the remainder of the pregnancy - no pools, tubs, sex, toys etc. You may experience some vaginal bleeding or spotting, please call your doctor if you soak a heavy pad in less than 2 hours. Please call your OBGYN for severe pain or fever >100.4 or you notice malodorous vaginal discharge.     Please take 48 hours of Indocin (sent to to your pharmacy) excluding any doses we may have already given you in the hospital.   Place Vaginal Progesterone 200mg nightly in vagina starting 4/27  You can take Tylenol 650mg every 6 hours for pain as needed.  Secondary Diagnosis:	Single pregnancy, antepartum  Assessment and plan of treatment:	Continue to follow-up with your OBGYN as instructed. Call your doctor or return to the hospital if you experience regular painful contractions, vaginal bleeding, leaking of fluids like your water broke, or if you experience decreased fetal movements.

## 2025-04-26 NOTE — OB RN INTRAOPERATIVE NOTE - NSOBSELHIDDEN_OBGYN_ALL_OB_FT
proable infection will treat with cipro 250 bid for 7 days [NSRNCirculatorProcedure1_OBGYN_ALL_OB_FT:[id number]]

## 2025-04-26 NOTE — OB PROVIDER H&P - NSHPPHYSICALEXAM_GEN_ALL_CORE
– PE:   CV: RRR  Pulm: breathing comfortably on RA  Abd: gravid, nontender  Extr: moving all extremities with ease    – Bedside ultrasound: +, variable presentation, fetal movement seen and good fluid volume

## 2025-04-26 NOTE — DISCHARGE NOTE ANTEPARTUM - CARE PROVIDER_API CALL
Princess Bojorquez  Obstetrics and Gynecology  [address], [address]  [place] [address]  Phone: [phone]  Fax: [phone]  Follow Up Time: 1 week

## 2025-04-26 NOTE — PROGRESS NOTE ADULT - ATTENDING COMMENTS
Pt seen and evaluated at bedside  Agree with above  Pt feels well this morning, no complaints  For eval by MFM for cerclage placement  BPs WNL this admission, continue to monitor   T2DM on insulin     jayde KAPADIA

## 2025-04-26 NOTE — OB RN INTRAOPERATIVE NOTE - NSELECTROSURGICALUNITBIOMEDNUMBER1_OBGYN_ALL_OB_FT
Attending Physician:     Dr. Galarza                       Procedure:   Colonoscopy    Indication for Procedure:   Evaluation for LVAD  ________________________________________________________  PAST MEDICAL & SURGICAL HISTORY:    AV block  Essential hypertension  Chronic HFrEF (heart failure with reduced ejection fraction)  Chronic kidney disease, unspecified CKD stage  CAD (coronary artery disease)  HLD (hyperlipidemia)  Type 2 diabetes mellitus    Artificial cardiac pacemaker      ALLERGIES:  No Known Allergies    HOME MEDICATIONS:  allopurinol: 75 mg orally once a day (hospital)  allopurinol 100 mg oral tablet: 1 tab(s) orally once a day (home)  amiodarone 200 mg oral tablet: 2 tab(s) orally every 12 hours until 5/31, then 1 tab orally twice a day (hospital)  carvedilol 12.5 mg oral tablet: 2 tab(s) orally every 12 hours (home)  Entresto 24 mg-26 mg oral tablet: 1 tab(s) orally 2 times a day (home)  glimepiride 1 mg oral tablet: 1 tab(s) orally once a day (home)  Lasix 40 mg oral tablet: 1 tab(s) orally once a day (home/hospital)  Lipitor 80 mg oral tablet: 1 tab(s) orally once a day (at bedtime) (hospital)  metoprolol succinate 25 mg oral tablet, extended release: 1 tab(s) orally every 12 hours (hospital)  pantoprazole 40 mg oral delayed release tablet: 1 tab(s) orally once a day (before a meal) (hospital)  spironolactone 25 mg oral tablet: 1 tab(s) orally once a day (hospital)  Vitamin D2 1.25 mg (50,000 intl units) oral capsule: 1 cap(s) orally once a week (home)    AICD/PPM: [X ] yes   [ ] no    PERTINENT LAB DATA:                        16.4   15.47 )-----------( 347      ( 03 Jun 2022 06:04 )             50.0     06-03    138  |  101  |  22  ----------------------------<  78  4.4   |  25  |  1.25    Ca    9.9      03 Jun 2022 06:02  TPro  7.8  /  Alb  4.1  /  TBili  0.8  /  DBili  x   /  AST  40  /  ALT  45  /  AlkPhos  74  06-01    PHYSICAL EXAMINATION:      Weight (kg): 72.9T(C): 36.4  HR: 98  BP: 127/89  RR: 18  SpO2: 97%    Constitutional: NAD    Neck:  No JVD  Respiratory: CTAB/L  Cardiovascular: S1 and S2  Gastrointestinal: BS+, soft, NT/ND  Extremities: No peripheral edema  Neurological: A/O x 4      COMMENTS:    The patient is a suitable candidate for the planned procedure unless box checked [ ]  No, explain:    
[id number]

## 2025-04-26 NOTE — OB PROVIDER H&P - HISTORY OF PRESENT ILLNESS
Labor and Delivery Admission H&P    Subjective    HPI: 34yoF  presents for cerclage placement. pt incidentally found to have short cervix on anatomy scan.  -LOF   -CTXs   -VB. Pt denies any other concerns.    ATU sono : anterior placenta, PIOTR wnl, 428g, cervix short w/ funneling on TVUS, fetal anatomy normal for GA - limited cardiac, hand, diaphragm views    Speculum exam in ATU:  No visible membranes.  Cervix   appears normal.  Sterile vaginal exam: FT dilated    PNC:  - T2DM on insulin   - possible gHTN - pt checking BPs at home, 24h urine submitted in office . triage visit on  with some 140/90s BPs. HELLP wnl at that time.     ObHx: Primigravida   GynHx: Denies hx of fibroids, ovarian cysts, abnml PAP smears, STIs  MedHx:   - Migraines in past no aura  - Type 2 DM  - gastritis (unable to take ASA)  - Tachycardia/palpitations s/p Cards consult, previously on Propranolol prior to pregnancy  - anxiety    Denies hx of HTN, DM, asthma, thyroid problems, blood clots/bleeding problems, hx of blood transfusions.  Meds: Glargine insulin 40u QHS, Novolog 22-24units with meals, PNV, Pepcid   All: NKDA  PSHx: Denies  Social: Denies alcohol/tobacco/drug use in pregnancy  Psych: Denies hx of anxiety/depression     – Will accept blood transfusions? Yes      Objective  – VS  T(C): 36.8 (25 @ 23:28)  HR: 108 (25 @ 23:28)  BP: 109/72 (25 @ 23:28)  RR: 18 (25 @ 23:28)  SpO2: 98% (25 @ 23:28)      – PE:   CV: RRR  Pulm: breathing comfortably on RA  Abd: gravid, nontender  Extr: moving all extremities with ease    – Bedside ultrasound: +FH *** Labor and Delivery Admission H&P    Subjective    HPI: 34yoF  presents for cerclage placement. pt incidentally found to have short cervix on anatomy scan.  -LOF   -CTXs   -VB. Pt denies any other concerns.    ATU sono : anterior placenta, PIOTR wnl, 428g, cervix short w/ funneling on TVUS, fetal anatomy normal for GA - limited cardiac, hand, diaphragm views    Speculum exam in ATU:  No visible membranes.  Cervix   appears normal.  Sterile vaginal exam: FT dilated    PNC:  - T2DM on insulin   - possible gHTN - pt checking BPs at home, 24h urine submitted in office . triage visit on  with some 140/90s BPs. HELLP wnl at that time.     ObHx: Primigravida   GynHx: Denies hx of fibroids, ovarian cysts, abnml PAP smears, STIs  MedHx:   - Migraines in past no aura  - Type 2 DM  - gastritis (unable to take ASA)  - Tachycardia/palpitations s/p Cards consult, previously on Propranolol prior to pregnancy  - anxiety    Meds: Glargine insulin 40u QHS, Novolog 22-24units with meals, PNV, Pepcid   All: NKDA  PSHx: Denies  Social: Denies alcohol/tobacco/drug use in pregnancy  Psych: Denies hx of anxiety/depression     – Will accept blood transfusions? Yes      Objective  – VS  T(C): 36.8 (25 @ 23:28)  HR: 108 (25 @ 23:28)  BP: 109/72 (25 @ 23:28)  RR: 18 (25 @ 23:28)  SpO2: 98% (25 @ 23:28)      – PE:   CV: RRR  Pulm: breathing comfortably on RA  Abd: gravid, nontender  Extr: moving all extremities with ease    – Bedside ultrasound: +FH *** Labor and Delivery Admission H&P    Subjective    HPI: 34yoF  presents for cerclage placement. pt incidentally found to have short cervix on anatomy scan.  -LOF   -CTXs   -VB. Pt denies any other concerns.    ATU sono : anterior placenta, PIOTR wnl, 428g, cervix short w/ funneling on TVUS, fetal anatomy normal for GA - limited cardiac, hand, diaphragm views    Speculum exam in ATU:  No visible membranes.  Cervix   appears normal.  Sterile vaginal exam: FT dilated    PNC:  - T2DM on insulin   - possible gHTN - pt checking BPs at home, 24h urine submitted in office . triage visit on  with some 140/90s BPs. HELLP wnl at that time.     ObHx: Primigravida   GynHx: Denies hx of fibroids, ovarian cysts, abnml PAP smears, STIs  MedHx:   - Migraines in past no aura  - Type 2 DM  - gastritis (unable to take ASA)  - Tachycardia/palpitations s/p Cards consult, previously on Propranolol prior to pregnancy  - anxiety    Meds: Glargine insulin 40u QHS, Novolog 22-24units with meals, PNV, Pepcid   All: NKDA  PSHx: Denies  Social: Denies alcohol/tobacco/drug use in pregnancy  Psych: Denies hx of anxiety/depression     – Will accept blood transfusions? Yes      Objective  – VS  T(C): 36.8 (25 @ 23:28)  HR: 108 (25 @ 23:28)  BP: 109/72 (25 @ 23:28)  RR: 18 (25 @ 23:28)  SpO2: 98% (25 @ 23:28)

## 2025-04-26 NOTE — OB PROVIDER H&P - ASSESSMENT
34yoF  presents for cerclage placement. pt incidentally found to have short cervix on anatomy scan.     #short cervix   - pt added on for cerclage in AM  - NPO/NS@125     #T2DM  - c/w home Lantus QHS (pt given half dose in setting of NPO)  - hold mealtime insulin while NPO   - FS q6 hours while NPO     #possible gHTN  - pending 24h urine   - monitor BPs  - HELLP labs    #fetal wellbeing  - FH check on admission and pro/post op    To be d/w Dr Brown    34yoF  presents for cerclage placement. pt incidentally found to have short cervix on anatomy scan.     #short cervix   - pt added on for cerclage in AM  - NPO/NS@125   -UCx, GC/CT ()    #T2DM  - c/w home Lantus QHS (pt given half dose in setting of NPO)  - hold mealtime insulin while NPO   - FS q6 hours while NPO     #possible gHTN  - pending 24h urine   - monitor BPs  - HELLP labs    #fetal wellbeing  - FH check on admission and pro/post op    To be d/w Dr Brown

## 2025-04-27 LAB
CULTURE RESULTS: SIGNIFICANT CHANGE UP
SPECIMEN SOURCE: SIGNIFICANT CHANGE UP

## 2025-04-28 LAB
C TRACH RRNA SPEC QL NAA+PROBE: SIGNIFICANT CHANGE UP
N GONORRHOEA RRNA SPEC QL NAA+PROBE: SIGNIFICANT CHANGE UP
SPECIMEN SOURCE: SIGNIFICANT CHANGE UP

## 2025-05-01 ENCOUNTER — ASOB RESULT (OUTPATIENT)
Age: 35
End: 2025-05-01

## 2025-05-01 ENCOUNTER — NON-APPOINTMENT (OUTPATIENT)
Age: 35
End: 2025-05-01

## 2025-05-01 ENCOUNTER — APPOINTMENT (OUTPATIENT)
Dept: MATERNAL FETAL MEDICINE | Facility: CLINIC | Age: 35
End: 2025-05-01
Payer: COMMERCIAL

## 2025-05-01 PROCEDURE — 99215 OFFICE O/P EST HI 40 MIN: CPT | Mod: 95

## 2025-05-02 ENCOUNTER — ASOB RESULT (OUTPATIENT)
Age: 35
End: 2025-05-02

## 2025-05-02 ENCOUNTER — APPOINTMENT (OUTPATIENT)
Dept: ANTEPARTUM | Facility: CLINIC | Age: 35
End: 2025-05-02
Payer: COMMERCIAL

## 2025-05-02 PROCEDURE — 76816 OB US FOLLOW-UP PER FETUS: CPT

## 2025-05-02 PROCEDURE — 76817 TRANSVAGINAL US OBSTETRIC: CPT

## 2025-05-04 ENCOUNTER — OUTPATIENT (OUTPATIENT)
Dept: INPATIENT UNIT | Facility: HOSPITAL | Age: 35
LOS: 1 days | End: 2025-05-04
Payer: COMMERCIAL

## 2025-05-04 VITALS — HEART RATE: 103 BPM | DIASTOLIC BLOOD PRESSURE: 85 MMHG | SYSTOLIC BLOOD PRESSURE: 131 MMHG

## 2025-05-04 VITALS
OXYGEN SATURATION: 100 % | SYSTOLIC BLOOD PRESSURE: 130 MMHG | TEMPERATURE: 99 F | DIASTOLIC BLOOD PRESSURE: 86 MMHG | HEART RATE: 110 BPM | RESPIRATION RATE: 16 BRPM

## 2025-05-04 DIAGNOSIS — O26.899 OTHER SPECIFIED PREGNANCY RELATED CONDITIONS, UNSPECIFIED TRIMESTER: ICD-10-CM

## 2025-05-04 LAB
APPEARANCE UR: CLEAR — SIGNIFICANT CHANGE UP
BACTERIA # UR AUTO: NEGATIVE /HPF — SIGNIFICANT CHANGE UP
BILIRUB UR-MCNC: NEGATIVE — SIGNIFICANT CHANGE UP
CAST: 0 /LPF — SIGNIFICANT CHANGE UP (ref 0–4)
COLOR SPEC: YELLOW — SIGNIFICANT CHANGE UP
DIFF PNL FLD: NEGATIVE — SIGNIFICANT CHANGE UP
GLUCOSE BLDC GLUCOMTR-MCNC: 72 MG/DL — SIGNIFICANT CHANGE UP (ref 70–99)
GLUCOSE UR QL: NEGATIVE MG/DL — SIGNIFICANT CHANGE UP
KETONES UR-MCNC: NEGATIVE MG/DL — SIGNIFICANT CHANGE UP
LEUKOCYTE ESTERASE UR-ACNC: ABNORMAL
NITRITE UR-MCNC: NEGATIVE — SIGNIFICANT CHANGE UP
PH UR: 6 — SIGNIFICANT CHANGE UP (ref 5–8)
PROT UR-MCNC: NEGATIVE MG/DL — SIGNIFICANT CHANGE UP
RBC CASTS # UR COMP ASSIST: 1 /HPF — SIGNIFICANT CHANGE UP (ref 0–4)
SP GR SPEC: 1.01 — SIGNIFICANT CHANGE UP (ref 1–1.03)
SQUAMOUS # UR AUTO: 3 /HPF — SIGNIFICANT CHANGE UP (ref 0–5)
UROBILINOGEN FLD QL: 0.2 MG/DL — SIGNIFICANT CHANGE UP (ref 0.2–1)
WBC UR QL: 2 /HPF — SIGNIFICANT CHANGE UP (ref 0–5)

## 2025-05-04 PROCEDURE — 99213 OFFICE O/P EST LOW 20 MIN: CPT

## 2025-05-04 NOTE — OB PROVIDER TRIAGE NOTE - NSOBPROVIDERNOTE_OBGYN_ALL_OB_FT
35 yo , EGA@ 22 6/7 weeks, Gibson cerclage in place on , presented to R/O  labor   UA  no S/S of  labor at this time.  Pt to keep self very well hydrated  stable for discharge   Labor precautions/ pelvic rest, if not in labor, will follow up with OB for the next schedule appointment.  All ordered tests results reviewed and interpreted.  Plan of care was reviewed with patient and family; patient states understanding of the above plan.  Pt Discharged home @ 1445PM  Discuss with Dr. Caitlin Melendez NP

## 2025-05-04 NOTE — OB PROVIDER TRIAGE NOTE - NSHPLABSRESULTS_GEN_ALL_CORE
Urinalysis Basic - ( 04 May 2025 13:43 )    Color: Yellow / Appearance: Clear / S.012 / pH: x  Gluc: x / Ketone: Negative mg/dL  / Bili: Negative / Urobili: 0.2 mg/dL   Blood: x / Protein: Negative mg/dL / Nitrite: Negative   Leuk Esterase: Trace / RBC: 1 /HPF / WBC 2 /HPF   Sq Epi: x / Non Sq Epi: 3 /HPF / Bacteria: Negative /HPF    Finger stick 72

## 2025-05-04 NOTE — OB PROVIDER TRIAGE NOTE - NSHPPHYSICALEXAM_GEN_ALL_CORE
– PE:   CV: RRR  Pulm: breathing comfortably on RA  Abd: gravid, nontender  Extr: moving all extremities with ease    – Bedside ultrasound: +, variable presentation, fetal movement seen and good fluid volume Vital Signs Last 24 Hrs  T(C): 37.2 (04 May 2025 12:56), Max: 37.2 (04 May 2025 12:56)  T(F): 99 (04 May 2025 12:56), Max: 99 (04 May 2025 12:56)  HR: 111 (04 May 2025 13:21) (110 - 111)  BP: 120/79 (04 May 2025 13:21) (120/79 - 130/86)  BP(mean): --  RR: 16 (04 May 2025 12:56) (16 - 16)  SpO2: 100% (04 May 2025 12:56) (100% - 100%)  General: Female sitting comfortably   Neuro: No facial asymmetry, no slurred speech, moves all 4 extremities  Mood: Alert and lucid, appropriate mood and affect  Head: Normocephalic. Atraumatic.   Eyes: No discharge, lids normal, conjunctiva normal  Lungs: No respiratory distress  Abdomen: Soft, nontender. Gravid. No contractions on palpation  Extremities: No LE edema bilaterally  [place]: no Contractions present  Saved in ASOB- TAUS: cephalic presentation, anterior placenta, PIOTR MVP 6.47cm, good fetal movement, M-mode 145bpm  SSE: scant amount of leukorrhea, cervix appear close with abdominal cerclage visualized.  no bleeding from os, no bleeding with valsalva, no pooling, no membranes visualized, FFN collected and held  Saved in ASOB- TVUS: cervical length 1.6cm with funneling no dynamical changes noted. exam chaperoned by: BENJAMIN Lawson

## 2025-05-04 NOTE — OB RN TRIAGE NOTE - CURRENT PREGNANCY COMPLICATIONS, OB PROFILE
Incompetent Cervix/Cervical Insufficiency/Gestational Age less than 36 Weeks cerclageplaced 4/25 Arthur, 24hr urine less than 100/Incompetent Cervix/Cervical Insufficiency/Gestational Age less than 36 Weeks/Hypertensive Disorder

## 2025-05-04 NOTE — OB PROVIDER TRIAGE NOTE - HISTORY OF PRESENT ILLNESS
33 yo , EGA@ 22 6/7 weeks, Gibson cerclage in place on , presented to D&T  from the MD office with C/O of abdominal tightening around the belly button. Pt report that the pain started the pain was worse last night and was better today.  denies vaginal bleeding, leakage of fluid, and reports fetal flutters now.   Denies fever, chills, sexual activities, urinary symptoms or constipation.   Prenatal care with Dr. Bojorquez  Prenatal course is complicated by:   - incompetent cervix- cerclage in place 25- progesterone @ night  - type 2 DM- FS WNL, Pt with glucose monitor on the right arm. on Glargine @ HS/ Novolog with meals  - ? Gestational Hypertension- baseline 24Hr urine collection WNL/ Pt sated that she is checking her blood pressure 3X/ daily   - sinus tachycardia follow with Dr. Holguin- Pt with an ulter monitor until 25, pending ECHO (Pt stated that she need to schedule for one) and fetal ECHO ( 25)  - PCOS  - Anxiety- no meds/ no therapy   Meds: progesterone HS, Pepcid PRN, Glargine 40u HS, Novolog 22U with meals, PNV  Allergies: NKDA

## 2025-05-04 NOTE — OB PROVIDER TRIAGE NOTE - CURRENT PREGNANCY COMPLICATIONS, OB PROFILE
cerclage placed 4/25 Arthur, 24hr urine less than 100/Incompetent Cervix/Cervical Insufficiency/Gestational Age less than 36 Weeks/Hypertensive Disorder

## 2025-05-04 NOTE — OB PROVIDER TRIAGE NOTE - NS_OBACUITYLEVEL_OBGYN_ALL_OB
Anemia Management Note  SUBJECTIVE/OBJECTIVE:  Referred by Dr. Sweetie Bain on 2023  Primary Diagnosis: Anemia of Other Chronic Illness (D63.8), CKD stage 3a     Secondary Diagnosis:  GAVE, Alcoholic cirrhosis of liver with ascites (K70.3) Anemia in CKD Satge 3a.   *2/15/23 Ok to use CKD dx codes per Dr. Sweetie Bain.   Hgb goal range:  9-10  Epo/Darbo: TBD: Hgb >9.0  Iron regimen: Infed 6/3/23  Labs : 24  Recent FELI use, transfusion, IV iron: NA  RX/TX plans : TBD     No history of stroke, MI and blood clots or cancers.     Contact:            No Boxes Checked on Consent to Communicate.         Latest Ref Rng & Units 2023    10:55 AM 5/3/2023    11:50 AM 2023    10:16 AM 2023    11:21 AM 2023    10:36 AM 2023    12:05 PM 6/3/2023     7:10 AM   Anemia   Hemoglobin 13.3 - 17.7 g/dL 9.7   9.4   10.5   10.0   9.9   10.8      IV Iron Dose        1000mg   TSAT 15 - 46 % 36   52     26       Ferritin 26 - 388 ng/mL 60   75     118         BP Readings from Last 3 Encounters:   23 125/77   23 119/73   23 121/65     Wt Readings from Last 2 Encounters:   04/10/23 116.3 kg (256 lb 6.4 oz)   23 120.3 kg (265 lb 3.2 oz)           ASSESSMENT:  Hgb:at goal - continue to monitor  TSat: Due for iron studies 4 weeks after last IV iron infusion Ferritin: Due for iron studies 4 weeks after last IV iron infusion    PLAN:  Check iron studies in 4 week(s)    Orders needed to be renewed (for next follow-up date) in EPIC: None    Iron labs due:  4 weeks after last IV iron infusion    Plan discussed with:  No call, chart review       NEXT FOLLOW-UP DATE:  7/3/23 Iron Labs are due     Gretta Brar RN   Anemia Services  [address] [address]   [email or web address]   Office : [phone]  Fax: [phone]          
2

## 2025-05-05 ENCOUNTER — ASOB RESULT (OUTPATIENT)
Age: 35
End: 2025-05-05

## 2025-05-05 ENCOUNTER — APPOINTMENT (OUTPATIENT)
Dept: ANTEPARTUM | Facility: CLINIC | Age: 35
End: 2025-05-05
Payer: COMMERCIAL

## 2025-05-05 PROCEDURE — 76827 ECHO EXAM OF FETAL HEART: CPT

## 2025-05-05 PROCEDURE — 76825 ECHO EXAM OF FETAL HEART: CPT

## 2025-05-05 PROCEDURE — 93325 DOPPLER ECHO COLOR FLOW MAPG: CPT

## 2025-05-06 ENCOUNTER — NON-APPOINTMENT (OUTPATIENT)
Age: 35
End: 2025-05-06

## 2025-05-06 DIAGNOSIS — O34.12 MATERNAL CARE FOR BENIGN TUMOR OF CORPUS UTERI, SECOND TRIMESTER: ICD-10-CM

## 2025-05-06 DIAGNOSIS — O16.2 UNSPECIFIED MATERNAL HYPERTENSION, SECOND TRIMESTER: ICD-10-CM

## 2025-05-06 DIAGNOSIS — E11.9 TYPE 2 DIABETES MELLITUS WITHOUT COMPLICATIONS: ICD-10-CM

## 2025-05-06 DIAGNOSIS — N83.209 UNSPECIFIED OVARIAN CYST, UNSPECIFIED SIDE: ICD-10-CM

## 2025-05-06 DIAGNOSIS — D25.9 LEIOMYOMA OF UTERUS, UNSPECIFIED: ICD-10-CM

## 2025-05-06 DIAGNOSIS — O09.512 SUPERVISION OF ELDERLY PRIMIGRAVIDA, SECOND TRIMESTER: ICD-10-CM

## 2025-05-06 DIAGNOSIS — O99.282 ENDOCRINE, NUTRITIONAL AND METABOLIC DISEASES COMPLICATING PREGNANCY, SECOND TRIMESTER: ICD-10-CM

## 2025-05-06 DIAGNOSIS — Z3A.22 22 WEEKS GESTATION OF PREGNANCY: ICD-10-CM

## 2025-05-06 DIAGNOSIS — O24.112 PRE-EXISTING TYPE 2 DIABETES MELLITUS, IN PREGNANCY, SECOND TRIMESTER: ICD-10-CM

## 2025-05-06 DIAGNOSIS — O34.32 MATERNAL CARE FOR CERVICAL INCOMPETENCE, SECOND TRIMESTER: ICD-10-CM

## 2025-05-06 DIAGNOSIS — O34.82 MATERNAL CARE FOR OTHER ABNORMALITIES OF PELVIC ORGANS, SECOND TRIMESTER: ICD-10-CM

## 2025-05-06 DIAGNOSIS — Z79.4 LONG TERM (CURRENT) USE OF INSULIN: ICD-10-CM

## 2025-05-06 DIAGNOSIS — O26.892 OTHER SPECIFIED PREGNANCY RELATED CONDITIONS, SECOND TRIMESTER: ICD-10-CM

## 2025-05-07 ENCOUNTER — APPOINTMENT (OUTPATIENT)
Dept: MATERNAL FETAL MEDICINE | Facility: CLINIC | Age: 35
End: 2025-05-07

## 2025-05-07 ENCOUNTER — ASOB RESULT (OUTPATIENT)
Age: 35
End: 2025-05-07

## 2025-05-07 ENCOUNTER — APPOINTMENT (OUTPATIENT)
Dept: OBGYN | Facility: CLINIC | Age: 35
End: 2025-05-07

## 2025-05-07 ENCOUNTER — APPOINTMENT (OUTPATIENT)
Dept: MATERNAL FETAL MEDICINE | Facility: CLINIC | Age: 35
End: 2025-05-07
Payer: COMMERCIAL

## 2025-05-07 PROCEDURE — 99214 OFFICE O/P EST MOD 30 MIN: CPT | Mod: 95

## 2025-05-07 PROCEDURE — G0108 DIAB MANAGE TRN  PER INDIV: CPT | Mod: 95

## 2025-05-07 RX ORDER — BLOOD-GLUCOSE METER
W/DEVICE KIT MISCELLANEOUS
Qty: 1 | Refills: 0 | Status: ACTIVE | COMMUNITY
Start: 2025-05-07 | End: 1900-01-01

## 2025-05-07 RX ORDER — BLOOD-GLUCOSE METER
KIT MISCELLANEOUS
Qty: 2 | Refills: 2 | Status: ACTIVE | COMMUNITY
Start: 2025-05-07 | End: 1900-01-01

## 2025-05-07 RX ORDER — LANCETS 33 GAUGE
EACH MISCELLANEOUS
Qty: 4 | Refills: 2 | Status: ACTIVE | COMMUNITY
Start: 2025-05-07 | End: 1900-01-01

## 2025-05-12 ENCOUNTER — NON-APPOINTMENT (OUTPATIENT)
Age: 35
End: 2025-05-12

## 2025-05-13 ENCOUNTER — APPOINTMENT (OUTPATIENT)
Dept: OBGYN | Facility: CLINIC | Age: 35
End: 2025-05-13
Payer: COMMERCIAL

## 2025-05-13 VITALS
WEIGHT: 189 LBS | HEIGHT: 64 IN | BODY MASS INDEX: 32.27 KG/M2 | DIASTOLIC BLOOD PRESSURE: 84 MMHG | HEART RATE: 125 BPM | SYSTOLIC BLOOD PRESSURE: 115 MMHG

## 2025-05-13 DIAGNOSIS — N88.3 INCOMPETENCE OF CERVIX UTERI: ICD-10-CM

## 2025-05-13 PROCEDURE — 0502F SUBSEQUENT PRENATAL CARE: CPT

## 2025-05-14 PROBLEM — N88.3 SHORT CERVIX: Status: ACTIVE | Noted: 2025-05-14

## 2025-05-14 RX ORDER — PROGESTERONE 200 MG/1
200 CAPSULE ORAL AT BEDTIME
Qty: 30 | Refills: 3 | Status: ACTIVE | COMMUNITY
Start: 2025-05-14 | End: 1900-01-01

## 2025-05-16 ENCOUNTER — ASOB RESULT (OUTPATIENT)
Age: 35
End: 2025-05-16

## 2025-05-16 ENCOUNTER — APPOINTMENT (OUTPATIENT)
Dept: ANTEPARTUM | Facility: CLINIC | Age: 35
End: 2025-05-16

## 2025-05-16 PROCEDURE — 76817 TRANSVAGINAL US OBSTETRIC: CPT

## 2025-05-16 PROCEDURE — 76816 OB US FOLLOW-UP PER FETUS: CPT

## 2025-05-20 ENCOUNTER — APPOINTMENT (OUTPATIENT)
Dept: OBGYN | Facility: CLINIC | Age: 35
End: 2025-05-20

## 2025-05-27 ENCOUNTER — NON-APPOINTMENT (OUTPATIENT)
Age: 35
End: 2025-05-27

## 2025-05-27 ENCOUNTER — APPOINTMENT (OUTPATIENT)
Dept: OBGYN | Facility: CLINIC | Age: 35
End: 2025-05-27
Payer: COMMERCIAL

## 2025-05-27 VITALS
BODY MASS INDEX: 33.29 KG/M2 | DIASTOLIC BLOOD PRESSURE: 84 MMHG | HEIGHT: 64 IN | SYSTOLIC BLOOD PRESSURE: 128 MMHG | HEART RATE: 125 BPM | WEIGHT: 195 LBS

## 2025-05-27 PROCEDURE — 0502F SUBSEQUENT PRENATAL CARE: CPT

## 2025-05-29 ENCOUNTER — APPOINTMENT (OUTPATIENT)
Dept: MATERNAL FETAL MEDICINE | Facility: CLINIC | Age: 35
End: 2025-05-29

## 2025-05-29 ENCOUNTER — ASOB RESULT (OUTPATIENT)
Age: 35
End: 2025-05-29

## 2025-05-29 PROCEDURE — 99214 OFFICE O/P EST MOD 30 MIN: CPT | Mod: 95

## 2025-05-29 PROCEDURE — G0108 DIAB MANAGE TRN  PER INDIV: CPT | Mod: 95

## 2025-06-03 ENCOUNTER — ASOB RESULT (OUTPATIENT)
Age: 35
End: 2025-06-03

## 2025-06-03 ENCOUNTER — RESULT REVIEW (OUTPATIENT)
Age: 35
End: 2025-06-03

## 2025-06-03 ENCOUNTER — OUTPATIENT (OUTPATIENT)
Dept: INPATIENT UNIT | Facility: HOSPITAL | Age: 35
LOS: 1 days | End: 2025-06-03
Payer: COMMERCIAL

## 2025-06-03 ENCOUNTER — NON-APPOINTMENT (OUTPATIENT)
Age: 35
End: 2025-06-03

## 2025-06-03 ENCOUNTER — OUTPATIENT (OUTPATIENT)
Dept: OUTPATIENT SERVICES | Facility: HOSPITAL | Age: 35
LOS: 1 days | End: 2025-06-03
Payer: COMMERCIAL

## 2025-06-03 ENCOUNTER — APPOINTMENT (OUTPATIENT)
Dept: CV DIAGNOSITCS | Facility: HOSPITAL | Age: 35
End: 2025-06-03

## 2025-06-03 ENCOUNTER — APPOINTMENT (OUTPATIENT)
Dept: ANTEPARTUM | Facility: CLINIC | Age: 35
End: 2025-06-03

## 2025-06-03 VITALS
SYSTOLIC BLOOD PRESSURE: 110 MMHG | RESPIRATION RATE: 16 BRPM | OXYGEN SATURATION: 100 % | DIASTOLIC BLOOD PRESSURE: 65 MMHG | TEMPERATURE: 98 F | HEART RATE: 110 BPM

## 2025-06-03 VITALS — SYSTOLIC BLOOD PRESSURE: 122 MMHG | DIASTOLIC BLOOD PRESSURE: 76 MMHG | HEART RATE: 108 BPM

## 2025-06-03 DIAGNOSIS — O26.899 OTHER SPECIFIED PREGNANCY RELATED CONDITIONS, UNSPECIFIED TRIMESTER: ICD-10-CM

## 2025-06-03 DIAGNOSIS — R00.2 PALPITATIONS: ICD-10-CM

## 2025-06-03 LAB
APPEARANCE UR: CLEAR — SIGNIFICANT CHANGE UP
BILIRUB UR-MCNC: NEGATIVE — SIGNIFICANT CHANGE UP
COLOR SPEC: YELLOW — SIGNIFICANT CHANGE UP
DIFF PNL FLD: NEGATIVE — SIGNIFICANT CHANGE UP
GLUCOSE UR QL: >=1000 MG/DL
KETONES UR QL: ABNORMAL MG/DL
LEUKOCYTE ESTERASE UR-ACNC: NEGATIVE — SIGNIFICANT CHANGE UP
NITRITE UR-MCNC: NEGATIVE — SIGNIFICANT CHANGE UP
PH UR: 5.5 — SIGNIFICANT CHANGE UP (ref 5–8)
PROT UR-MCNC: SIGNIFICANT CHANGE UP MG/DL
SP GR SPEC: 1.03 — HIGH (ref 1–1.03)
UROBILINOGEN FLD QL: 0.2 MG/DL — SIGNIFICANT CHANGE UP (ref 0.2–1)

## 2025-06-03 PROCEDURE — 59025 FETAL NON-STRESS TEST: CPT | Mod: 26

## 2025-06-03 PROCEDURE — 76815 OB US LIMITED FETUS(S): CPT | Mod: 26

## 2025-06-03 PROCEDURE — 93306 TTE W/DOPPLER COMPLETE: CPT | Mod: 26

## 2025-06-03 PROCEDURE — 99221 1ST HOSP IP/OBS SF/LOW 40: CPT | Mod: 25

## 2025-06-03 RX ORDER — INSULIN ASPART 100 [IU]/ML
0 INJECTION, SOLUTION INTRAVENOUS; SUBCUTANEOUS
Refills: 0 | DISCHARGE

## 2025-06-03 NOTE — OB PROVIDER TRIAGE NOTE - CURRENT PREGNANCY COMPLICATIONS, OB PROFILE
cerclage placed 4/25 Arthur, 24hr urine less than 100, Type 2 Diabetes, Anterior placenta/Incompetent Cervix/Cervical Insufficiency/Gestational Age less than 36 Weeks/Hypertensive Disorder

## 2025-06-03 NOTE — OB PROVIDER TRIAGE NOTE - NSHPPHYSICALEXAM_GEN_ALL_CORE
T(C): 36.9 (06-03-25 @ 14:17), Max: 36.9 (06-03-25 @ 13:52)  HR: 108 (06-03-25 @ 16:22) (107 - 111)  BP: 122/76 (06-03-25 @ 16:22) (110/65 - 132/80)  RR: 16 (06-03-25 @ 14:17) (16 - 16)  SpO2: 100% (06-03-25 @ 13:52) (100% - 100%)    Head: Normocephalic. Atraumatic.   Eyes: No discharge, lids normal, conjunctiva normal  Lungs: No respiratory distress  Abdomen: Soft, nontender. Gravid.   TAUS:  Sono saved in ASOB. cephalic, anterior placenta, m-mode 159, MVP 4.79  Neuro: No facial asymmetry, no slurred speech, moves all 4 extremities  Mood: Alert and lucid, appropriate mood and affect    BENJAMIN Stewart by the bedside as chaperone  SSE: cervix does not appear dilated, stitch in place, no tension on stitch  TVS: 1.1-1.2 cm ( no change to previous) T(C): 36.9 (06-03-25 @ 14:17), Max: 36.9 (06-03-25 @ 13:52)  HR: 108 (06-03-25 @ 16:22) (107 - 111)  BP: 122/76 (06-03-25 @ 16:22) (110/65 - 132/80)  RR: 16 (06-03-25 @ 14:17) (16 - 16)  SpO2: 100% (06-03-25 @ 13:52) (100% - 100%)    Head: Normocephalic. Atraumatic.   Eyes: No discharge, lids normal, conjunctiva normal  Lungs: No respiratory distress  Abdomen: Soft, nontender. Gravid.   TAUS:  Sono saved in ASOB. cephalic, anterior placenta, m-mode 159, MVP 4.79  Neuro: No facial asymmetry, no slurred speech, moves all 4 extremities  Mood: Alert and lucid, appropriate mood and affect    BENJAMIN Stewart by the bedside as chaperone  SSE: cervix does not appear dilated, stitch in place, no tension on stitch, no blood present in vault  TVS: 1.1-1.2 cm ( no change to previous) T(C): 36.9 (06-03-25 @ 14:17), Max: 36.9 (06-03-25 @ 13:52)  HR: 108 (06-03-25 @ 16:22) (107 - 111)  BP: 122/76 (06-03-25 @ 16:22) (110/65 - 132/80)  RR: 16 (06-03-25 @ 14:17) (16 - 16)  SpO2: 100% (06-03-25 @ 13:52) (100% - 100%)    Head: Normocephalic. Atraumatic.   Eyes: No discharge, lids normal, conjunctiva normal  Lungs: No respiratory distress  Abdomen: Soft, nontender. Gravid.   TAUS:  Sono saved in ASOB. cephalic, anterior placenta, m-mode 159, MVP 4.79  Neuro: No facial asymmetry, no slurred speech, moves all 4 extremities  Mood: Alert and lucid, appropriate mood and affect    Terry RN by the bedside as chaperone  SSE: cervix does not appear dilated, stitch in place, no tension on stitch, no blood present in vault  TVS: 1.1-1.2 cm ( no change to previous)    NST  Baseline  (    140      ) BPM  Variability (  x)  Moderate   (  ) Minimal  (  ) Absent  (  )  Marked  Accelerations ( x ) 15x15   (  ) 10x10  (  ) no  Decelerations ( x ) no  (  ) Variable  (  ) Early  (  ) Late     Description _________  Contractions ( x ) no  (  ) yes     Description  __________  Interpretation (x ) reactive   (  )  non-reactive

## 2025-06-03 NOTE — OB RN TRIAGE NOTE - BP NONINVASIVE DIASTOLIC (MM HG)
IVAN spoke with Zafar at [facility] and provided an update.She is trying to find the [facility]ET[facility]
list of memory care for this pt, and wants a reminder call at the end of the
day if it has not been provided. 65

## 2025-06-03 NOTE — OB PROVIDER TRIAGE NOTE - HISTORY OF PRESENT ILLNESS
patient is a 35 y/o EDC 2025 EGA 27   reports of decreased fetal movement, cramps x 2 days and one spot of brown discharge. Patient had a Gibson cerclage in place since . Patient reports of feeling fetal movement once placed on monitor. Patient denies vaginal bleeding, leakage of fluid, and reports fetal flutters now.       Antepartum History:   Prenatal care with Dr. Bojorquez  Prenatal course is complicated by:   - incompetent cervix- cerclage in place 25- progesterone @ night  - type 2 DM- FS WNL, Pt with glucose monitor on the right arm. on Glargine @ HS/ Novolog with meals  - Rule out Gestational Hypertension- baseline 24Hr urine collection WNL  - sinus tachycardia follow with Dr. Holguin- Pt with an ulter monitor until 25, maternal ECHO (2025) and fetal ECHO ( 25)  - PCOS  - Anxiety- no meds/ no therapy after being in [facility]  was previously on Xanax   - ATU (): vertex, anterior, no previa, MVP 6.81, , CL 1.2, funnel length 2.85cm  Meds: progesterone HS, Pepcid PRN, Lantus 44u HS, Novolog 32U with meals, PNV  Allergies: NKDA    patient is a 33 y/o EDC 2025 EGA 27   reports of decreased fetal movement, cramps x 2 days and one spot of brown discharge. Patient had a Gibson cerclage in place since . Patient reports of feeling fetal movement once placed on monitor. Patient denies vaginal bleeding, leakage of fluid, and reports fetal flutters now.       Antepartum History:   Prenatal care with Dr. Bojorquez  Prenatal course is complicated by:  - Blood Type: B positive   - incompetent cervix- cerclage in place 25- progesterone @ night  - type 2 DM- FS WNL, Pt with glucose monitor on the right arm. on Glargine @ HS/ Novolog with meals  - Rule out Gestational Hypertension- baseline 24Hr urine collection WNL  - sinus tachycardia follow with Dr. Holguin- Pt with an ulter monitor until 25, maternal ECHO (2025) and fetal ECHO ( 25)  - PCOS  - Anxiety- no meds/ no therapy after being in [facility]  was previously on Xanax   - ATU (): vertex, anterior, no previa, MVP 6.81, , CL 1.2, funnel length 2.85cm  Meds: progesterone HS, Pepcid PRN, Lantus 44u HS, Novolog 32U with meals, PNV  Allergies: NKDA

## 2025-06-03 NOTE — OB RN TRIAGE NOTE - NSICDXPASTMEDICALHX_GEN_ALL_CORE_FT
PAST MEDICAL HISTORY:  Bulging of cervical intervertebral disc     Diabetes type 2     Fibroid, uterine     GERD (gastroesophageal reflux disease)     H/O sinus tachycardia     Migraines     PCOS (polycystic ovarian syndrome)

## 2025-06-03 NOTE — OB RN TRIAGE NOTE - CURRENT PREGNANCY COMPLICATIONS, OB PROFILE
cerclageplaced 4/25 Arthur, 24hr urine less than 100, Type 2 Diabetes, Anterior placenta/Incompetent Cervix/Cervical Insufficiency/Gestational Age less than 36 Weeks/Hypertensive Disorder

## 2025-06-03 NOTE — OB PROVIDER TRIAGE NOTE - ADDITIONAL INSTRUCTIONS
patient is a 33 y/o EDC 2025 EGA 27   encounter for decreased fetal movement and contractions x 2 days and brown spot  - Discussed with   - Patient to be discharged home with follow up and return precautions  - Please follow up with your obstetrician at your next scheduled appointment.   - Please return for decreased/no fetal movement, vaginal bleeding similar to that of a period, leaking/gush of fluid, regular contractions or requiring pain medication   - Patient and partner and educated of plan and demonstrate understanding. All questions answered. Discharge instructions provided and signed.   - Discharged at 1702

## 2025-06-03 NOTE — OB PROVIDER TRIAGE NOTE - NSHPLABSRESULTS_GEN_ALL_CORE
UA: Urinalysis Basic - ( 2025 16:41 )    Color: Yellow / Appearance: Clear / S.035 / pH: x  Gluc: x / Ketone: x  / Bili: Negative / Urobili: 0.2 mg/dL   Blood: x / Protein: Trace mg/dL / Nitrite: Negative   Leuk Esterase: Negative / RBC: x / WBC x   Sq Epi: x / Non Sq Epi: x / Bacteria: x

## 2025-06-03 NOTE — OB PROVIDER TRIAGE NOTE - NSOBPROVIDERNOTE_OBGYN_ALL_OB_FT
patient is a 35 y/o EDC 2025 EGA 27   encounter for decreased fetal movement and contractions x 2 days and brown spot  - Discussed with   - Patient to be discharged home with follow up and return precautions  - Please follow up with your obstetrician at your next scheduled appointment.   - Please return for decreased/no fetal movement, vaginal bleeding similar to that of a period, leaking/gush of fluid, regular contractions or requiring pain medication   - Patient and partner and educated of plan and demonstrate understanding. All questions answered. Discharge instructions provided and signed.   - Discharged at ___  patient is a 33 y/o EDC 2025 EGA 27   encounter for decreased fetal movement and contractions x 2 days and brown spot  - Discussed with   - Patient to be discharged home with follow up and return precautions  - Please follow up with your obstetrician at your next scheduled appointment.   - Please return for decreased/no fetal movement, vaginal bleeding similar to that of a period, leaking/gush of fluid, regular contractions or requiring pain medication   - Patient and partner and educated of plan and demonstrate understanding. All questions answered. Discharge instructions provided and signed.   - Discharged at 1702

## 2025-06-05 DIAGNOSIS — O36.8120 DECREASED FETAL MOVEMENTS, SECOND TRIMESTER, NOT APPLICABLE OR UNSPECIFIED: ICD-10-CM

## 2025-06-05 DIAGNOSIS — O99.282 ENDOCRINE, NUTRITIONAL AND METABOLIC DISEASES COMPLICATING PREGNANCY, SECOND TRIMESTER: ICD-10-CM

## 2025-06-05 DIAGNOSIS — O26.852 SPOTTING COMPLICATING PREGNANCY, SECOND TRIMESTER: ICD-10-CM

## 2025-06-05 DIAGNOSIS — O99.342 OTHER MENTAL DISORDERS COMPLICATING PREGNANCY, SECOND TRIMESTER: ICD-10-CM

## 2025-06-05 DIAGNOSIS — O34.12 MATERNAL CARE FOR BENIGN TUMOR OF CORPUS UTERI, SECOND TRIMESTER: ICD-10-CM

## 2025-06-05 DIAGNOSIS — D25.9 LEIOMYOMA OF UTERUS, UNSPECIFIED: ICD-10-CM

## 2025-06-05 DIAGNOSIS — O24.112 PRE-EXISTING TYPE 2 DIABETES MELLITUS, IN PREGNANCY, SECOND TRIMESTER: ICD-10-CM

## 2025-06-05 DIAGNOSIS — E28.2 POLYCYSTIC OVARIAN SYNDROME: ICD-10-CM

## 2025-06-05 DIAGNOSIS — O99.352 DISEASES OF THE NERVOUS SYSTEM COMPLICATING PREGNANCY, SECOND TRIMESTER: ICD-10-CM

## 2025-06-05 DIAGNOSIS — O34.32 MATERNAL CARE FOR CERVICAL INCOMPETENCE, SECOND TRIMESTER: ICD-10-CM

## 2025-06-05 DIAGNOSIS — F41.9 ANXIETY DISORDER, UNSPECIFIED: ICD-10-CM

## 2025-06-05 DIAGNOSIS — O26.892 OTHER SPECIFIED PREGNANCY RELATED CONDITIONS, SECOND TRIMESTER: ICD-10-CM

## 2025-06-05 DIAGNOSIS — G43.909 MIGRAINE, UNSPECIFIED, NOT INTRACTABLE, WITHOUT STATUS MIGRAINOSUS: ICD-10-CM

## 2025-06-05 DIAGNOSIS — O13.2 GESTATIONAL [PREGNANCY-INDUCED] HYPERTENSION WITHOUT SIGNIFICANT PROTEINURIA, SECOND TRIMESTER: ICD-10-CM

## 2025-06-05 DIAGNOSIS — Z3A.27 27 WEEKS GESTATION OF PREGNANCY: ICD-10-CM

## 2025-06-05 DIAGNOSIS — R10.9 UNSPECIFIED ABDOMINAL PAIN: ICD-10-CM

## 2025-06-05 DIAGNOSIS — O09.522 SUPERVISION OF ELDERLY MULTIGRAVIDA, SECOND TRIMESTER: ICD-10-CM

## 2025-06-06 ENCOUNTER — ASOB RESULT (OUTPATIENT)
Age: 35
End: 2025-06-06

## 2025-06-06 ENCOUNTER — APPOINTMENT (OUTPATIENT)
Dept: ANTEPARTUM | Facility: CLINIC | Age: 35
End: 2025-06-06
Payer: COMMERCIAL

## 2025-06-06 PROCEDURE — 76816 OB US FOLLOW-UP PER FETUS: CPT

## 2025-06-06 PROCEDURE — 99214 OFFICE O/P EST MOD 30 MIN: CPT | Mod: 25

## 2025-06-06 PROCEDURE — 76819 FETAL BIOPHYS PROFIL W/O NST: CPT

## 2025-06-10 ENCOUNTER — APPOINTMENT (OUTPATIENT)
Dept: OBGYN | Facility: CLINIC | Age: 35
End: 2025-06-10
Payer: COMMERCIAL

## 2025-06-10 VITALS
WEIGHT: 199 LBS | SYSTOLIC BLOOD PRESSURE: 122 MMHG | BODY MASS INDEX: 33.97 KG/M2 | HEART RATE: 123 BPM | DIASTOLIC BLOOD PRESSURE: 81 MMHG | HEIGHT: 64 IN

## 2025-06-10 PROCEDURE — 0502F SUBSEQUENT PRENATAL CARE: CPT

## 2025-06-15 LAB
BASOPHILS # BLD AUTO: 0.03 K/UL
BASOPHILS NFR BLD AUTO: 0.2 %
EOSINOPHIL # BLD AUTO: 0.15 K/UL
EOSINOPHIL NFR BLD AUTO: 1.2 %
HCT VFR BLD CALC: 37.2 %
HGB BLD-MCNC: 11.3 G/DL
IMM GRANULOCYTES NFR BLD AUTO: 1.3 %
LYMPHOCYTES # BLD AUTO: 2.07 K/UL
LYMPHOCYTES NFR BLD AUTO: 16.9 %
MAN DIFF?: NORMAL
MCHC RBC-ENTMCNC: 28.6 PG
MCHC RBC-ENTMCNC: 30.4 G/DL
MCV RBC AUTO: 94.2 FL
MONOCYTES # BLD AUTO: 1.06 K/UL
MONOCYTES NFR BLD AUTO: 8.6 %
NEUTROPHILS # BLD AUTO: 8.8 K/UL
NEUTROPHILS NFR BLD AUTO: 71.8 %
PLATELET # BLD AUTO: 282 K/UL
RBC # BLD: 3.95 M/UL
RBC # FLD: 14.3 %
T PALLIDUM AB SER QL IA: NEGATIVE
WBC # FLD AUTO: 12.27 K/UL

## 2025-06-16 ENCOUNTER — APPOINTMENT (OUTPATIENT)
Dept: MATERNAL FETAL MEDICINE | Facility: CLINIC | Age: 35
End: 2025-06-16

## 2025-06-16 ENCOUNTER — ASOB RESULT (OUTPATIENT)
Age: 35
End: 2025-06-16

## 2025-06-16 PROCEDURE — 99214 OFFICE O/P EST MOD 30 MIN: CPT | Mod: 95

## 2025-06-16 PROCEDURE — G0108 DIAB MANAGE TRN  PER INDIV: CPT | Mod: 95

## 2025-06-17 ENCOUNTER — ASOB RESULT (OUTPATIENT)
Age: 35
End: 2025-06-17

## 2025-06-17 ENCOUNTER — APPOINTMENT (OUTPATIENT)
Dept: CARDIOLOGY | Facility: CLINIC | Age: 35
End: 2025-06-17
Payer: COMMERCIAL

## 2025-06-17 VITALS
DIASTOLIC BLOOD PRESSURE: 75 MMHG | HEIGHT: 64 IN | OXYGEN SATURATION: 95 % | HEART RATE: 120 BPM | RESPIRATION RATE: 16 BRPM | WEIGHT: 198 LBS | BODY MASS INDEX: 33.8 KG/M2 | TEMPERATURE: 98.5 F | SYSTOLIC BLOOD PRESSURE: 122 MMHG

## 2025-06-17 PROCEDURE — 93000 ELECTROCARDIOGRAM COMPLETE: CPT

## 2025-06-17 PROCEDURE — 99215 OFFICE O/P EST HI 40 MIN: CPT

## 2025-06-17 PROCEDURE — G2211 COMPLEX E/M VISIT ADD ON: CPT | Mod: NC

## 2025-06-27 ENCOUNTER — APPOINTMENT (OUTPATIENT)
Dept: ANTEPARTUM | Facility: CLINIC | Age: 35
End: 2025-06-27
Payer: COMMERCIAL

## 2025-06-27 ENCOUNTER — ASOB RESULT (OUTPATIENT)
Age: 35
End: 2025-06-27

## 2025-06-27 PROCEDURE — 76816 OB US FOLLOW-UP PER FETUS: CPT

## 2025-06-27 PROCEDURE — 76819 FETAL BIOPHYS PROFIL W/O NST: CPT

## 2025-07-02 ENCOUNTER — ASOB RESULT (OUTPATIENT)
Age: 35
End: 2025-07-02

## 2025-07-02 ENCOUNTER — APPOINTMENT (OUTPATIENT)
Dept: MATERNAL FETAL MEDICINE | Facility: CLINIC | Age: 35
End: 2025-07-02

## 2025-07-02 PROCEDURE — 99214 OFFICE O/P EST MOD 30 MIN: CPT | Mod: 95

## 2025-07-07 PROBLEM — Z34.93 THIRD TRIMESTER PREGNANCY: Status: ACTIVE | Noted: 2025-06-09

## 2025-07-08 ENCOUNTER — APPOINTMENT (OUTPATIENT)
Dept: OBGYN | Facility: CLINIC | Age: 35
End: 2025-07-08
Payer: COMMERCIAL

## 2025-07-08 ENCOUNTER — APPOINTMENT (OUTPATIENT)
Dept: OBGYN | Facility: CLINIC | Age: 35
End: 2025-07-08

## 2025-07-08 VITALS
HEART RATE: 67 BPM | SYSTOLIC BLOOD PRESSURE: 129 MMHG | BODY MASS INDEX: 35.17 KG/M2 | HEIGHT: 64 IN | DIASTOLIC BLOOD PRESSURE: 84 MMHG | WEIGHT: 206 LBS

## 2025-07-08 PROBLEM — O99.719 PRURITUS OF PREGNANCY: Status: ACTIVE | Noted: 2025-07-08

## 2025-07-08 PROBLEM — O99.713 PRURITUS OF PREGNANCY IN THIRD TRIMESTER: Status: ACTIVE | Noted: 2025-07-08

## 2025-07-08 PROCEDURE — 90715 TDAP VACCINE 7 YRS/> IM: CPT

## 2025-07-08 PROCEDURE — 0502F SUBSEQUENT PRENATAL CARE: CPT

## 2025-07-08 PROCEDURE — 90471 IMMUNIZATION ADMIN: CPT

## 2025-07-10 LAB
ALBUMIN SERPL ELPH-MCNC: 3.4 G/DL
ALP BLD-CCNC: 107 U/L
ALT SERPL-CCNC: 22 U/L
ANION GAP SERPL CALC-SCNC: 14 MMOL/L
AST SERPL-CCNC: 17 U/L
BILIRUB SERPL-MCNC: <0.2 MG/DL
BUN SERPL-MCNC: 11 MG/DL
CALCIUM SERPL-MCNC: 8.8 MG/DL
CHLORIDE SERPL-SCNC: 104 MMOL/L
CO2 SERPL-SCNC: 21 MMOL/L
CREAT SERPL-MCNC: 0.45 MG/DL
EGFRCR SERPLBLD CKD-EPI 2021: 128 ML/MIN/1.73M2
GLUCOSE SERPL-MCNC: 109 MG/DL
POTASSIUM SERPL-SCNC: 4.1 MMOL/L
PROT SERPL-MCNC: 6.9 G/DL
SODIUM SERPL-SCNC: 138 MMOL/L

## 2025-07-11 ENCOUNTER — APPOINTMENT (OUTPATIENT)
Dept: MATERNAL FETAL MEDICINE | Facility: CLINIC | Age: 35
End: 2025-07-11

## 2025-07-11 ENCOUNTER — APPOINTMENT (OUTPATIENT)
Dept: ANTEPARTUM | Facility: CLINIC | Age: 35
End: 2025-07-11
Payer: COMMERCIAL

## 2025-07-11 ENCOUNTER — NON-APPOINTMENT (OUTPATIENT)
Age: 35
End: 2025-07-11

## 2025-07-11 ENCOUNTER — ASOB RESULT (OUTPATIENT)
Age: 35
End: 2025-07-11

## 2025-07-11 PROCEDURE — 99214 OFFICE O/P EST MOD 30 MIN: CPT | Mod: 25,95

## 2025-07-11 PROCEDURE — 76818 FETAL BIOPHYS PROFILE W/NST: CPT

## 2025-07-14 ENCOUNTER — APPOINTMENT (OUTPATIENT)
Dept: CARDIOLOGY | Facility: CLINIC | Age: 35
End: 2025-07-14
Payer: COMMERCIAL

## 2025-07-14 VITALS
HEART RATE: 110 BPM | HEIGHT: 64 IN | SYSTOLIC BLOOD PRESSURE: 119 MMHG | WEIGHT: 206 LBS | BODY MASS INDEX: 35.17 KG/M2 | OXYGEN SATURATION: 94 % | DIASTOLIC BLOOD PRESSURE: 83 MMHG

## 2025-07-14 LAB — BILE AC SER-MCNC: 2.6 UMOL/L

## 2025-07-14 PROCEDURE — G2211 COMPLEX E/M VISIT ADD ON: CPT | Mod: NC

## 2025-07-14 PROCEDURE — 93000 ELECTROCARDIOGRAM COMPLETE: CPT

## 2025-07-14 PROCEDURE — 99214 OFFICE O/P EST MOD 30 MIN: CPT

## 2025-07-15 ENCOUNTER — ASOB RESULT (OUTPATIENT)
Age: 35
End: 2025-07-15

## 2025-07-15 ENCOUNTER — APPOINTMENT (OUTPATIENT)
Dept: ANTEPARTUM | Facility: CLINIC | Age: 35
End: 2025-07-15
Payer: COMMERCIAL

## 2025-07-15 PROCEDURE — 76816 OB US FOLLOW-UP PER FETUS: CPT

## 2025-07-18 ENCOUNTER — ASOB RESULT (OUTPATIENT)
Age: 35
End: 2025-07-18

## 2025-07-18 ENCOUNTER — APPOINTMENT (OUTPATIENT)
Dept: ANTEPARTUM | Facility: CLINIC | Age: 35
End: 2025-07-18
Payer: COMMERCIAL

## 2025-07-18 ENCOUNTER — INPATIENT (INPATIENT)
Facility: HOSPITAL | Age: 35
LOS: 8 days | Discharge: HOME | End: 2025-07-27
Attending: OBSTETRICS & GYNECOLOGY | Admitting: OBSTETRICS & GYNECOLOGY
Payer: COMMERCIAL

## 2025-07-18 ENCOUNTER — APPOINTMENT (OUTPATIENT)
Dept: ANTEPARTUM | Facility: CLINIC | Age: 35
End: 2025-07-18

## 2025-07-18 VITALS
HEART RATE: 101 BPM | RESPIRATION RATE: 16 BRPM | OXYGEN SATURATION: 99 % | SYSTOLIC BLOOD PRESSURE: 125 MMHG | TEMPERATURE: 99 F | DIASTOLIC BLOOD PRESSURE: 74 MMHG

## 2025-07-18 DIAGNOSIS — O26.899 OTHER SPECIFIED PREGNANCY RELATED CONDITIONS, UNSPECIFIED TRIMESTER: ICD-10-CM

## 2025-07-18 DIAGNOSIS — E11.9 TYPE 2 DIABETES MELLITUS WITHOUT COMPLICATIONS: ICD-10-CM

## 2025-07-18 LAB
ALBUMIN SERPL ELPH-MCNC: 3.3 G/DL — SIGNIFICANT CHANGE UP (ref 3.3–5)
ALP SERPL-CCNC: 96 U/L — SIGNIFICANT CHANGE UP (ref 40–120)
ALT FLD-CCNC: 17 U/L — SIGNIFICANT CHANGE UP (ref 4–33)
ANION GAP SERPL CALC-SCNC: 14 MMOL/L — SIGNIFICANT CHANGE UP (ref 7–14)
APPEARANCE UR: CLEAR — SIGNIFICANT CHANGE UP
AST SERPL-CCNC: 13 U/L — SIGNIFICANT CHANGE UP (ref 4–32)
BACTERIA # UR AUTO: NEGATIVE /HPF — SIGNIFICANT CHANGE UP
BASOPHILS # BLD AUTO: 0.02 K/UL — SIGNIFICANT CHANGE UP (ref 0–0.2)
BASOPHILS NFR BLD AUTO: 0.2 % — SIGNIFICANT CHANGE UP (ref 0–2)
BILIRUB SERPL-MCNC: <0.2 MG/DL — SIGNIFICANT CHANGE UP (ref 0.2–1.2)
BILIRUB UR-MCNC: NEGATIVE — SIGNIFICANT CHANGE UP
BLD GP AB SCN SERPL QL: NEGATIVE — SIGNIFICANT CHANGE UP
BUN SERPL-MCNC: 10 MG/DL — SIGNIFICANT CHANGE UP (ref 7–23)
CALCIUM SERPL-MCNC: 9.3 MG/DL — SIGNIFICANT CHANGE UP (ref 8.4–10.5)
CAST: 0 /LPF — SIGNIFICANT CHANGE UP (ref 0–4)
CHLORIDE SERPL-SCNC: 102 MMOL/L — SIGNIFICANT CHANGE UP (ref 98–107)
CO2 SERPL-SCNC: 20 MMOL/L — LOW (ref 22–31)
COLOR SPEC: YELLOW — SIGNIFICANT CHANGE UP
CREAT ?TM UR-MCNC: 30 MG/DL — SIGNIFICANT CHANGE UP
CREAT SERPL-MCNC: 0.38 MG/DL — LOW (ref 0.5–1.3)
DIFF PNL FLD: NEGATIVE — SIGNIFICANT CHANGE UP
EGFR: 133 ML/MIN/1.73M2 — SIGNIFICANT CHANGE UP
EGFR: 133 ML/MIN/1.73M2 — SIGNIFICANT CHANGE UP
EOSINOPHIL # BLD AUTO: 0.1 K/UL — SIGNIFICANT CHANGE UP (ref 0–0.5)
EOSINOPHIL NFR BLD AUTO: 1.1 % — SIGNIFICANT CHANGE UP (ref 0–6)
GLUCOSE BLDC GLUCOMTR-MCNC: 102 MG/DL — HIGH (ref 70–99)
GLUCOSE BLDC GLUCOMTR-MCNC: 127 MG/DL — HIGH (ref 70–99)
GLUCOSE BLDC GLUCOMTR-MCNC: 87 MG/DL — SIGNIFICANT CHANGE UP (ref 70–99)
GLUCOSE SERPL-MCNC: 106 MG/DL — HIGH (ref 70–99)
GLUCOSE UR QL: >=1000 MG/DL
HCT VFR BLD CALC: 35.9 % — SIGNIFICANT CHANGE UP (ref 34.5–45)
HGB BLD-MCNC: 11.7 G/DL — SIGNIFICANT CHANGE UP (ref 11.5–15.5)
IMM GRANULOCYTES # BLD AUTO: 0.09 K/UL — HIGH (ref 0–0.07)
IMM GRANULOCYTES NFR BLD AUTO: 1 % — HIGH (ref 0–0.9)
KETONES UR QL: NEGATIVE MG/DL — SIGNIFICANT CHANGE UP
LDH SERPL L TO P-CCNC: 179 U/L — SIGNIFICANT CHANGE UP (ref 135–225)
LEUKOCYTE ESTERASE UR-ACNC: ABNORMAL
LYMPHOCYTES # BLD AUTO: 1.7 K/UL — SIGNIFICANT CHANGE UP (ref 1–3.3)
LYMPHOCYTES NFR BLD AUTO: 18 % — SIGNIFICANT CHANGE UP (ref 13–44)
MCHC RBC-ENTMCNC: 28.1 PG — SIGNIFICANT CHANGE UP (ref 27–34)
MCHC RBC-ENTMCNC: 32.6 G/DL — SIGNIFICANT CHANGE UP (ref 32–36)
MCV RBC AUTO: 86.3 FL — SIGNIFICANT CHANGE UP (ref 80–100)
MONOCYTES # BLD AUTO: 0.8 K/UL — SIGNIFICANT CHANGE UP (ref 0–0.9)
MONOCYTES NFR BLD AUTO: 8.5 % — SIGNIFICANT CHANGE UP (ref 2–14)
NEUTROPHILS # BLD AUTO: 6.75 K/UL — SIGNIFICANT CHANGE UP (ref 1.8–7.4)
NEUTROPHILS NFR BLD AUTO: 71.2 % — SIGNIFICANT CHANGE UP (ref 43–77)
NITRITE UR-MCNC: NEGATIVE — SIGNIFICANT CHANGE UP
NRBC # BLD AUTO: 0 K/UL — SIGNIFICANT CHANGE UP (ref 0–0)
NRBC # FLD: 0 K/UL — SIGNIFICANT CHANGE UP (ref 0–0)
NRBC BLD AUTO-RTO: 0 /100 WBCS — SIGNIFICANT CHANGE UP (ref 0–0)
PH UR: 6 — SIGNIFICANT CHANGE UP (ref 5–8)
PLATELET # BLD AUTO: 239 K/UL — SIGNIFICANT CHANGE UP (ref 150–400)
PMV BLD: 10.7 FL — SIGNIFICANT CHANGE UP (ref 7–13)
POTASSIUM SERPL-MCNC: 4.1 MMOL/L — SIGNIFICANT CHANGE UP (ref 3.5–5.3)
POTASSIUM SERPL-SCNC: 4.1 MMOL/L — SIGNIFICANT CHANGE UP (ref 3.5–5.3)
PROT ?TM UR-MCNC: 5 MG/DL — SIGNIFICANT CHANGE UP
PROT SERPL-MCNC: 7.3 G/DL — SIGNIFICANT CHANGE UP (ref 6–8.3)
PROT UR-MCNC: NEGATIVE MG/DL — SIGNIFICANT CHANGE UP
PROT/CREAT UR-RTO: 0.2 RATIO — SIGNIFICANT CHANGE UP (ref 0–0.2)
RBC # BLD: 4.16 M/UL — SIGNIFICANT CHANGE UP (ref 3.8–5.2)
RBC # FLD: 13.8 % — SIGNIFICANT CHANGE UP (ref 10.3–14.5)
RBC CASTS # UR COMP ASSIST: 2 /HPF — SIGNIFICANT CHANGE UP (ref 0–4)
RH IG SCN BLD-IMP: POSITIVE — SIGNIFICANT CHANGE UP
SODIUM SERPL-SCNC: 136 MMOL/L — SIGNIFICANT CHANGE UP (ref 135–145)
SP GR SPEC: 1.01 — SIGNIFICANT CHANGE UP (ref 1–1.03)
SQUAMOUS # UR AUTO: 4 /HPF — SIGNIFICANT CHANGE UP (ref 0–5)
URATE SERPL-MCNC: 3.7 MG/DL — SIGNIFICANT CHANGE UP (ref 2.5–7)
UROBILINOGEN FLD QL: 0.2 MG/DL — SIGNIFICANT CHANGE UP (ref 0.2–1)
WBC # BLD: 9.46 K/UL — SIGNIFICANT CHANGE UP (ref 3.8–10.5)
WBC # FLD AUTO: 9.46 K/UL — SIGNIFICANT CHANGE UP (ref 3.8–10.5)
WBC UR QL: 5 /HPF — SIGNIFICANT CHANGE UP (ref 0–5)

## 2025-07-18 PROCEDURE — 99221 1ST HOSP IP/OBS SF/LOW 40: CPT | Mod: 25,GC

## 2025-07-18 PROCEDURE — 76819 FETAL BIOPHYS PROFIL W/O NST: CPT

## 2025-07-18 RX ORDER — INSULIN LISPRO 100 U/ML
INJECTION, SOLUTION INTRAVENOUS; SUBCUTANEOUS
Refills: 0 | Status: DISCONTINUED | OUTPATIENT
Start: 2025-07-18 | End: 2025-07-19

## 2025-07-18 RX ORDER — PROGESTERONE 200 MG/1
100 CAPSULE ORAL DAILY
Refills: 0 | Status: DISCONTINUED | OUTPATIENT
Start: 2025-07-18 | End: 2025-07-18

## 2025-07-18 RX ORDER — INSULIN LISPRO 100 U/ML
INJECTION, SOLUTION INTRAVENOUS; SUBCUTANEOUS AT BEDTIME
Refills: 0 | Status: DISCONTINUED | OUTPATIENT
Start: 2025-07-18 | End: 2025-07-19

## 2025-07-18 RX ORDER — PROGESTERONE 200 MG/1
200 CAPSULE ORAL AT BEDTIME
Refills: 0 | Status: DISCONTINUED | OUTPATIENT
Start: 2025-07-18 | End: 2025-07-19

## 2025-07-18 RX ORDER — SODIUM CHLORIDE 9 G/1000ML
1000 INJECTION, SOLUTION INTRAVENOUS
Refills: 0 | Status: DISCONTINUED | OUTPATIENT
Start: 2025-07-18 | End: 2025-07-19

## 2025-07-18 RX ADMIN — Medication 1 APPLICATION(S): at 16:32

## 2025-07-18 RX ADMIN — Medication 125 MILLILITER(S): at 16:32

## 2025-07-18 RX ADMIN — SODIUM CHLORIDE 125 MILLILITER(S): 9 INJECTION, SOLUTION INTRAVENOUS at 16:32

## 2025-07-18 RX ADMIN — PROGESTERONE 200 MILLIGRAM(S): 200 CAPSULE ORAL at 23:50

## 2025-07-19 LAB
A1C WITH ESTIMATED AVERAGE GLUCOSE RESULT: 6.6 % — HIGH (ref 4–5.6)
ESTIMATED AVERAGE GLUCOSE: 143 — SIGNIFICANT CHANGE UP
GLUCOSE BLDC GLUCOMTR-MCNC: 107 MG/DL — HIGH (ref 70–99)
GLUCOSE BLDC GLUCOMTR-MCNC: 107 MG/DL — HIGH (ref 70–99)
GLUCOSE BLDC GLUCOMTR-MCNC: 108 MG/DL — HIGH (ref 70–99)
GLUCOSE BLDC GLUCOMTR-MCNC: 109 MG/DL — HIGH (ref 70–99)
GLUCOSE BLDC GLUCOMTR-MCNC: 118 MG/DL — HIGH (ref 70–99)
GLUCOSE BLDC GLUCOMTR-MCNC: 119 MG/DL — HIGH (ref 70–99)
GLUCOSE BLDC GLUCOMTR-MCNC: 123 MG/DL — HIGH (ref 70–99)
GLUCOSE BLDC GLUCOMTR-MCNC: 124 MG/DL — HIGH (ref 70–99)
GLUCOSE BLDC GLUCOMTR-MCNC: 127 MG/DL — HIGH (ref 70–99)
GLUCOSE BLDC GLUCOMTR-MCNC: 127 MG/DL — HIGH (ref 70–99)
GLUCOSE BLDC GLUCOMTR-MCNC: 142 MG/DL — HIGH (ref 70–99)
GLUCOSE BLDC GLUCOMTR-MCNC: 149 MG/DL — HIGH (ref 70–99)
GLUCOSE BLDC GLUCOMTR-MCNC: 153 MG/DL — HIGH (ref 70–99)
GLUCOSE BLDC GLUCOMTR-MCNC: 77 MG/DL — SIGNIFICANT CHANGE UP (ref 70–99)
GLUCOSE BLDC GLUCOMTR-MCNC: 95 MG/DL — SIGNIFICANT CHANGE UP (ref 70–99)
T PALLIDUM AB TITR SER: NEGATIVE — SIGNIFICANT CHANGE UP

## 2025-07-19 PROCEDURE — 99233 SBSQ HOSP IP/OBS HIGH 50: CPT | Mod: GC,25

## 2025-07-19 RX ORDER — AMPICILLIN SODIUM 1 G/1
1 INJECTION, POWDER, FOR SOLUTION INTRAMUSCULAR; INTRAVENOUS EVERY 4 HOURS
Refills: 0 | Status: DISCONTINUED | OUTPATIENT
Start: 2025-07-19 | End: 2025-07-19

## 2025-07-19 RX ORDER — SODIUM CHLORIDE 9 G/1000ML
1000 INJECTION, SOLUTION INTRAVENOUS
Refills: 0 | Status: DISCONTINUED | OUTPATIENT
Start: 2025-07-19 | End: 2025-07-19

## 2025-07-19 RX ORDER — INSULIN GLARGINE-YFGN 100 [IU]/ML
60 INJECTION, SOLUTION SUBCUTANEOUS AT BEDTIME
Refills: 0 | Status: DISCONTINUED | OUTPATIENT
Start: 2025-07-20 | End: 2025-07-20

## 2025-07-19 RX ORDER — SODIUM CHLORIDE 9 G/1000ML
1000 INJECTION, SOLUTION INTRAVENOUS
Refills: 0 | Status: DISCONTINUED | OUTPATIENT
Start: 2025-07-19 | End: 2025-07-20

## 2025-07-19 RX ORDER — SODIUM CHLORIDE 9 G/1000ML
500 INJECTION, SOLUTION INTRAVENOUS ONCE
Refills: 0 | Status: DISCONTINUED | OUTPATIENT
Start: 2025-07-19 | End: 2025-07-21

## 2025-07-19 RX ORDER — INSULIN GLARGINE-YFGN 100 [IU]/ML
60 INJECTION, SOLUTION SUBCUTANEOUS ONCE
Refills: 0 | Status: COMPLETED | OUTPATIENT
Start: 2025-07-19 | End: 2025-07-19

## 2025-07-19 RX ORDER — ONDANSETRON HCL/PF 4 MG/2 ML
4 VIAL (ML) INJECTION ONCE
Refills: 0 | Status: COMPLETED | OUTPATIENT
Start: 2025-07-19 | End: 2025-07-19

## 2025-07-19 RX ORDER — AMPICILLIN SODIUM 1 G/1
2 INJECTION, POWDER, FOR SOLUTION INTRAMUSCULAR; INTRAVENOUS ONCE
Refills: 0 | Status: COMPLETED | OUTPATIENT
Start: 2025-07-19 | End: 2025-07-19

## 2025-07-19 RX ORDER — INSULIN LISPRO 100 U/ML
62 INJECTION, SOLUTION INTRAVENOUS; SUBCUTANEOUS
Refills: 0 | Status: DISCONTINUED | OUTPATIENT
Start: 2025-07-19 | End: 2025-07-20

## 2025-07-19 RX ADMIN — AMPICILLIN SODIUM 200 GRAM(S): 1 INJECTION, POWDER, FOR SOLUTION INTRAMUSCULAR; INTRAVENOUS at 03:46

## 2025-07-19 RX ADMIN — AMPICILLIN SODIUM 100 GRAM(S): 1 INJECTION, POWDER, FOR SOLUTION INTRAMUSCULAR; INTRAVENOUS at 07:52

## 2025-07-19 RX ADMIN — INSULIN LISPRO 62 UNIT(S): 100 INJECTION, SOLUTION INTRAVENOUS; SUBCUTANEOUS at 18:21

## 2025-07-19 RX ADMIN — INSULIN LISPRO 62 UNIT(S): 100 INJECTION, SOLUTION INTRAVENOUS; SUBCUTANEOUS at 15:26

## 2025-07-19 RX ADMIN — Medication 4 MILLIGRAM(S): at 03:48

## 2025-07-19 RX ADMIN — Medication 12 MILLIGRAM(S): at 03:48

## 2025-07-19 RX ADMIN — INSULIN GLARGINE-YFGN 60 UNIT(S): 100 INJECTION, SOLUTION SUBCUTANEOUS at 12:38

## 2025-07-19 RX ADMIN — Medication 20 MILLIGRAM(S): at 14:09

## 2025-07-19 RX ADMIN — Medication 1 UNIT(S)/HR: at 05:14

## 2025-07-19 RX ADMIN — Medication 1 UNIT(S)/HR: at 07:56

## 2025-07-20 LAB
A1C WITH ESTIMATED AVERAGE GLUCOSE RESULT: 6.7 % — HIGH (ref 4–5.6)
ALBUMIN SERPL ELPH-MCNC: 3 G/DL — LOW (ref 3.3–5)
ALP SERPL-CCNC: 85 U/L — SIGNIFICANT CHANGE UP (ref 40–120)
ALT FLD-CCNC: 17 U/L — SIGNIFICANT CHANGE UP (ref 4–33)
ANION GAP SERPL CALC-SCNC: 16 MMOL/L — HIGH (ref 7–14)
AST SERPL-CCNC: 14 U/L — SIGNIFICANT CHANGE UP (ref 4–32)
BASOPHILS # BLD AUTO: 0.02 K/UL — SIGNIFICANT CHANGE UP (ref 0–0.2)
BASOPHILS NFR BLD AUTO: 0.2 % — SIGNIFICANT CHANGE UP (ref 0–2)
BILIRUB SERPL-MCNC: <0.2 MG/DL — SIGNIFICANT CHANGE UP (ref 0.2–1.2)
BUN SERPL-MCNC: 10 MG/DL — SIGNIFICANT CHANGE UP (ref 7–23)
CALCIUM SERPL-MCNC: 8.8 MG/DL — SIGNIFICANT CHANGE UP (ref 8.4–10.5)
CHLORIDE SERPL-SCNC: 102 MMOL/L — SIGNIFICANT CHANGE UP (ref 98–107)
CO2 SERPL-SCNC: 16 MMOL/L — LOW (ref 22–31)
COLLECT DURATION TIME UR: 24 HR — SIGNIFICANT CHANGE UP
CREAT 24H UR-MCNC: 72 MG/DL — SIGNIFICANT CHANGE UP
CREAT SERPL-MCNC: 0.46 MG/DL — LOW (ref 0.5–1.3)
CREAT UR-MCNC: 72 MG/DL — SIGNIFICANT CHANGE UP
EGFR: 127 ML/MIN/1.73M2 — SIGNIFICANT CHANGE UP
EGFR: 127 ML/MIN/1.73M2 — SIGNIFICANT CHANGE UP
EOSINOPHIL # BLD AUTO: 0.02 K/UL — SIGNIFICANT CHANGE UP (ref 0–0.5)
EOSINOPHIL NFR BLD AUTO: 0.2 % — SIGNIFICANT CHANGE UP (ref 0–6)
ESTIMATED AVERAGE GLUCOSE: 146 — SIGNIFICANT CHANGE UP
GLUCOSE BLDC GLUCOMTR-MCNC: 137 MG/DL — HIGH (ref 70–99)
GLUCOSE BLDC GLUCOMTR-MCNC: 166 MG/DL — HIGH (ref 70–99)
GLUCOSE BLDC GLUCOMTR-MCNC: 179 MG/DL — HIGH (ref 70–99)
GLUCOSE BLDC GLUCOMTR-MCNC: 191 MG/DL — HIGH (ref 70–99)
GLUCOSE BLDC GLUCOMTR-MCNC: 192 MG/DL — HIGH (ref 70–99)
GLUCOSE BLDC GLUCOMTR-MCNC: 193 MG/DL — HIGH (ref 70–99)
GLUCOSE BLDC GLUCOMTR-MCNC: 194 MG/DL — HIGH (ref 70–99)
GLUCOSE SERPL-MCNC: 180 MG/DL — HIGH (ref 70–99)
HCT VFR BLD CALC: 34.2 % — LOW (ref 34.5–45)
HGB BLD-MCNC: 10.9 G/DL — LOW (ref 11.5–15.5)
IMM GRANULOCYTES # BLD AUTO: 0.15 K/UL — HIGH (ref 0–0.07)
IMM GRANULOCYTES NFR BLD AUTO: 1.4 % — HIGH (ref 0–0.9)
LDH SERPL L TO P-CCNC: 194 U/L — SIGNIFICANT CHANGE UP (ref 135–225)
LYMPHOCYTES # BLD AUTO: 1.63 K/UL — SIGNIFICANT CHANGE UP (ref 1–3.3)
LYMPHOCYTES NFR BLD AUTO: 15.3 % — SIGNIFICANT CHANGE UP (ref 13–44)
Lab: 111 MG/G — SIGNIFICANT CHANGE UP
Lab: 8 MG/DL — SIGNIFICANT CHANGE UP
MCHC RBC-ENTMCNC: 28 PG — SIGNIFICANT CHANGE UP (ref 27–34)
MCHC RBC-ENTMCNC: 31.9 G/DL — LOW (ref 32–36)
MCV RBC AUTO: 87.9 FL — SIGNIFICANT CHANGE UP (ref 80–100)
MONOCYTES # BLD AUTO: 0.78 K/UL — SIGNIFICANT CHANGE UP (ref 0–0.9)
MONOCYTES NFR BLD AUTO: 7.3 % — SIGNIFICANT CHANGE UP (ref 2–14)
NEUTROPHILS # BLD AUTO: 8.02 K/UL — HIGH (ref 1.8–7.4)
NEUTROPHILS NFR BLD AUTO: 75.6 % — SIGNIFICANT CHANGE UP (ref 43–77)
NRBC # BLD AUTO: 0 K/UL — SIGNIFICANT CHANGE UP (ref 0–0)
NRBC # FLD: 0 K/UL — SIGNIFICANT CHANGE UP (ref 0–0)
NRBC BLD AUTO-RTO: 0 /100 WBCS — SIGNIFICANT CHANGE UP (ref 0–0)
PLATELET # BLD AUTO: 233 K/UL — SIGNIFICANT CHANGE UP (ref 150–400)
PMV BLD: 10.9 FL — SIGNIFICANT CHANGE UP (ref 7–13)
POTASSIUM SERPL-MCNC: 3.9 MMOL/L — SIGNIFICANT CHANGE UP (ref 3.5–5.3)
POTASSIUM SERPL-SCNC: 3.9 MMOL/L — SIGNIFICANT CHANGE UP (ref 3.5–5.3)
PROT 24H UR-MRATE: 174 MG/24HR — HIGH
PROT SERPL-MCNC: 6.5 G/DL — SIGNIFICANT CHANGE UP (ref 6–8.3)
RBC # BLD: 3.89 M/UL — SIGNIFICANT CHANGE UP (ref 3.8–5.2)
RBC # FLD: 13.8 % — SIGNIFICANT CHANGE UP (ref 10.3–14.5)
SODIUM SERPL-SCNC: 134 MMOL/L — LOW (ref 135–145)
TOTAL VOLUME - URINE: 2100 ML — SIGNIFICANT CHANGE UP
URATE SERPL-MCNC: 5.8 MG/DL — SIGNIFICANT CHANGE UP (ref 2.5–7)
URINE CREATININE CALCULATION: 1.5 G/24 HR — SIGNIFICANT CHANGE UP (ref 0.8–1.6)
WBC # BLD: 10.62 K/UL — HIGH (ref 3.8–10.5)
WBC # FLD AUTO: 10.62 K/UL — HIGH (ref 3.8–10.5)

## 2025-07-20 RX ORDER — INSULIN LISPRO 100 U/ML
68 INJECTION, SOLUTION INTRAVENOUS; SUBCUTANEOUS
Refills: 0 | Status: DISCONTINUED | OUTPATIENT
Start: 2025-07-20 | End: 2025-07-22

## 2025-07-20 RX ORDER — SODIUM CHLORIDE 9 G/1000ML
500 INJECTION, SOLUTION INTRAVENOUS ONCE
Refills: 0 | Status: COMPLETED | OUTPATIENT
Start: 2025-07-20 | End: 2025-07-20

## 2025-07-20 RX ORDER — INSULIN GLARGINE-YFGN 100 [IU]/ML
60 INJECTION, SOLUTION SUBCUTANEOUS ONCE
Refills: 0 | Status: COMPLETED | OUTPATIENT
Start: 2025-07-20 | End: 2025-07-20

## 2025-07-20 RX ORDER — SODIUM CHLORIDE 9 G/1000ML
1000 INJECTION, SOLUTION INTRAVENOUS
Refills: 0 | Status: DISCONTINUED | OUTPATIENT
Start: 2025-07-20 | End: 2025-07-22

## 2025-07-20 RX ORDER — INSULIN GLARGINE-YFGN 100 [IU]/ML
60 INJECTION, SOLUTION SUBCUTANEOUS ONCE
Refills: 0 | Status: COMPLETED | OUTPATIENT
Start: 2025-07-21 | End: 2025-07-21

## 2025-07-20 RX ORDER — HEPARIN SODIUM 1000 [USP'U]/ML
5000 INJECTION INTRAVENOUS; SUBCUTANEOUS EVERY 12 HOURS
Refills: 0 | Status: DISCONTINUED | OUTPATIENT
Start: 2025-07-20 | End: 2025-07-20

## 2025-07-20 RX ORDER — INSULIN LISPRO 100 U/ML
68 INJECTION, SOLUTION INTRAVENOUS; SUBCUTANEOUS ONCE
Refills: 0 | Status: COMPLETED | OUTPATIENT
Start: 2025-07-20 | End: 2025-07-20

## 2025-07-20 RX ADMIN — Medication 12 MILLIGRAM(S): at 03:56

## 2025-07-20 RX ADMIN — INSULIN LISPRO 68 UNIT(S): 100 INJECTION, SOLUTION INTRAVENOUS; SUBCUTANEOUS at 16:52

## 2025-07-20 RX ADMIN — INSULIN LISPRO 68 UNIT(S): 100 INJECTION, SOLUTION INTRAVENOUS; SUBCUTANEOUS at 11:07

## 2025-07-20 RX ADMIN — Medication 20 MILLIGRAM(S): at 17:15

## 2025-07-20 RX ADMIN — SODIUM CHLORIDE 125 MILLILITER(S): 9 INJECTION, SOLUTION INTRAVENOUS at 22:43

## 2025-07-20 RX ADMIN — INSULIN GLARGINE-YFGN 60 UNIT(S): 100 INJECTION, SOLUTION SUBCUTANEOUS at 13:05

## 2025-07-20 RX ADMIN — SODIUM CHLORIDE 500 MILLILITER(S): 9 INJECTION, SOLUTION INTRAVENOUS at 19:42

## 2025-07-21 ENCOUNTER — APPOINTMENT (OUTPATIENT)
Dept: ANTEPARTUM | Facility: CLINIC | Age: 35
End: 2025-07-21
Payer: COMMERCIAL

## 2025-07-21 ENCOUNTER — ASOB RESULT (OUTPATIENT)
Age: 35
End: 2025-07-21

## 2025-07-21 DIAGNOSIS — Z04.9 ENCOUNTER FOR EXAMINATION AND OBSERVATION FOR UNSPECIFIED REASON: ICD-10-CM

## 2025-07-21 PROBLEM — G47.33 OBSTRUCTIVE SLEEP APNEA (ADULT) (PEDIATRIC): Chronic | Status: ACTIVE | Noted: 2025-07-18

## 2025-07-21 PROBLEM — Z86.69 PERSONAL HISTORY OF OTHER DISEASES OF THE NERVOUS SYSTEM AND SENSE ORGANS: Chronic | Status: ACTIVE | Noted: 2025-07-18

## 2025-07-21 LAB
ALBUMIN SERPL ELPH-MCNC: 2.8 G/DL — LOW (ref 3.3–5)
ALP SERPL-CCNC: 72 U/L — SIGNIFICANT CHANGE UP (ref 40–120)
ALT FLD-CCNC: 16 U/L — SIGNIFICANT CHANGE UP (ref 4–33)
ANION GAP SERPL CALC-SCNC: 12 MMOL/L — SIGNIFICANT CHANGE UP (ref 7–14)
AST SERPL-CCNC: 10 U/L — SIGNIFICANT CHANGE UP (ref 4–32)
BASOPHILS # BLD AUTO: 0.02 K/UL — SIGNIFICANT CHANGE UP (ref 0–0.2)
BASOPHILS NFR BLD AUTO: 0.2 % — SIGNIFICANT CHANGE UP (ref 0–2)
BILIRUB SERPL-MCNC: <0.2 MG/DL — SIGNIFICANT CHANGE UP (ref 0.2–1.2)
BUN SERPL-MCNC: 11 MG/DL — SIGNIFICANT CHANGE UP (ref 7–23)
CALCIUM SERPL-MCNC: 8.4 MG/DL — SIGNIFICANT CHANGE UP (ref 8.4–10.5)
CHLORIDE SERPL-SCNC: 104 MMOL/L — SIGNIFICANT CHANGE UP (ref 98–107)
CO2 SERPL-SCNC: 19 MMOL/L — LOW (ref 22–31)
CREAT SERPL-MCNC: 0.44 MG/DL — LOW (ref 0.5–1.3)
CULTURE RESULTS: SIGNIFICANT CHANGE UP
EGFR: 128 ML/MIN/1.73M2 — SIGNIFICANT CHANGE UP
EGFR: 128 ML/MIN/1.73M2 — SIGNIFICANT CHANGE UP
EOSINOPHIL # BLD AUTO: 0.02 K/UL — SIGNIFICANT CHANGE UP (ref 0–0.5)
EOSINOPHIL NFR BLD AUTO: 0.2 % — SIGNIFICANT CHANGE UP (ref 0–6)
GLUCOSE BLDC GLUCOMTR-MCNC: 133 MG/DL — HIGH (ref 70–99)
GLUCOSE BLDC GLUCOMTR-MCNC: 142 MG/DL — HIGH (ref 70–99)
GLUCOSE BLDC GLUCOMTR-MCNC: 150 MG/DL — HIGH (ref 70–99)
GLUCOSE BLDC GLUCOMTR-MCNC: 153 MG/DL — HIGH (ref 70–99)
GLUCOSE BLDC GLUCOMTR-MCNC: 155 MG/DL — HIGH (ref 70–99)
GLUCOSE BLDC GLUCOMTR-MCNC: 162 MG/DL — HIGH (ref 70–99)
GLUCOSE BLDC GLUCOMTR-MCNC: 165 MG/DL — HIGH (ref 70–99)
GLUCOSE BLDC GLUCOMTR-MCNC: 227 MG/DL — HIGH (ref 70–99)
GLUCOSE BLDC GLUCOMTR-MCNC: 93 MG/DL — SIGNIFICANT CHANGE UP (ref 70–99)
GLUCOSE SERPL-MCNC: 137 MG/DL — HIGH (ref 70–99)
HCT VFR BLD CALC: 29.9 % — LOW (ref 34.5–45)
HGB BLD-MCNC: 9.7 G/DL — LOW (ref 11.5–15.5)
IMM GRANULOCYTES # BLD AUTO: 0.23 K/UL — HIGH (ref 0–0.07)
IMM GRANULOCYTES NFR BLD AUTO: 2.2 % — HIGH (ref 0–0.9)
LDH SERPL L TO P-CCNC: 160 U/L — SIGNIFICANT CHANGE UP (ref 135–225)
LYMPHOCYTES # BLD AUTO: 1.92 K/UL — SIGNIFICANT CHANGE UP (ref 1–3.3)
LYMPHOCYTES NFR BLD AUTO: 18.3 % — SIGNIFICANT CHANGE UP (ref 13–44)
MCHC RBC-ENTMCNC: 28.3 PG — SIGNIFICANT CHANGE UP (ref 27–34)
MCHC RBC-ENTMCNC: 32.4 G/DL — SIGNIFICANT CHANGE UP (ref 32–36)
MCV RBC AUTO: 87.2 FL — SIGNIFICANT CHANGE UP (ref 80–100)
MONOCYTES # BLD AUTO: 1.41 K/UL — HIGH (ref 0–0.9)
MONOCYTES NFR BLD AUTO: 13.4 % — SIGNIFICANT CHANGE UP (ref 2–14)
NEUTROPHILS # BLD AUTO: 6.89 K/UL — SIGNIFICANT CHANGE UP (ref 1.8–7.4)
NEUTROPHILS NFR BLD AUTO: 65.7 % — SIGNIFICANT CHANGE UP (ref 43–77)
NRBC # BLD AUTO: 0 K/UL — SIGNIFICANT CHANGE UP (ref 0–0)
NRBC # FLD: 0 K/UL — SIGNIFICANT CHANGE UP (ref 0–0)
NRBC BLD AUTO-RTO: 0 /100 WBCS — SIGNIFICANT CHANGE UP (ref 0–0)
PLATELET # BLD AUTO: 210 K/UL — SIGNIFICANT CHANGE UP (ref 150–400)
PMV BLD: 11 FL — SIGNIFICANT CHANGE UP (ref 7–13)
POTASSIUM SERPL-MCNC: 3.8 MMOL/L — SIGNIFICANT CHANGE UP (ref 3.5–5.3)
POTASSIUM SERPL-SCNC: 3.8 MMOL/L — SIGNIFICANT CHANGE UP (ref 3.5–5.3)
PROT SERPL-MCNC: 5.9 G/DL — LOW (ref 6–8.3)
RBC # BLD: 3.43 M/UL — LOW (ref 3.8–5.2)
RBC # FLD: 14 % — SIGNIFICANT CHANGE UP (ref 10.3–14.5)
SODIUM SERPL-SCNC: 135 MMOL/L — SIGNIFICANT CHANGE UP (ref 135–145)
SPECIMEN SOURCE: SIGNIFICANT CHANGE UP
URATE SERPL-MCNC: 6 MG/DL — SIGNIFICANT CHANGE UP (ref 2.5–7)
WBC # BLD: 10.49 K/UL — SIGNIFICANT CHANGE UP (ref 3.8–10.5)
WBC # FLD AUTO: 10.49 K/UL — SIGNIFICANT CHANGE UP (ref 3.8–10.5)

## 2025-07-21 PROCEDURE — 76819 FETAL BIOPHYS PROFIL W/O NST: CPT | Mod: 26

## 2025-07-21 RX ORDER — INSULIN GLARGINE-YFGN 100 [IU]/ML
60 INJECTION, SOLUTION SUBCUTANEOUS ONCE
Refills: 0 | Status: COMPLETED | OUTPATIENT
Start: 2025-07-22 | End: 2025-07-22

## 2025-07-21 RX ADMIN — INSULIN GLARGINE-YFGN 60 UNIT(S): 100 INJECTION, SOLUTION SUBCUTANEOUS at 14:45

## 2025-07-21 RX ADMIN — Medication 20 MILLIGRAM(S): at 11:37

## 2025-07-21 RX ADMIN — INSULIN LISPRO 68 UNIT(S): 100 INJECTION, SOLUTION INTRAVENOUS; SUBCUTANEOUS at 00:06

## 2025-07-21 RX ADMIN — INSULIN LISPRO 68 UNIT(S): 100 INJECTION, SOLUTION INTRAVENOUS; SUBCUTANEOUS at 13:10

## 2025-07-21 RX ADMIN — INSULIN LISPRO 68 UNIT(S): 100 INJECTION, SOLUTION INTRAVENOUS; SUBCUTANEOUS at 21:44

## 2025-07-21 RX ADMIN — INSULIN LISPRO 68 UNIT(S): 100 INJECTION, SOLUTION INTRAVENOUS; SUBCUTANEOUS at 09:34

## 2025-07-22 LAB
BLD GP AB SCN SERPL QL: NEGATIVE — SIGNIFICANT CHANGE UP
GLUCOSE BLDC GLUCOMTR-MCNC: 111 MG/DL — HIGH (ref 70–99)
GLUCOSE BLDC GLUCOMTR-MCNC: 122 MG/DL — HIGH (ref 70–99)
GLUCOSE BLDC GLUCOMTR-MCNC: 123 MG/DL — HIGH (ref 70–99)
GLUCOSE BLDC GLUCOMTR-MCNC: 124 MG/DL — HIGH (ref 70–99)
GLUCOSE BLDC GLUCOMTR-MCNC: 170 MG/DL — HIGH (ref 70–99)
GLUCOSE BLDC GLUCOMTR-MCNC: 92 MG/DL — SIGNIFICANT CHANGE UP (ref 70–99)
RH IG SCN BLD-IMP: POSITIVE — SIGNIFICANT CHANGE UP

## 2025-07-22 PROCEDURE — 99232 SBSQ HOSP IP/OBS MODERATE 35: CPT | Mod: GC

## 2025-07-22 RX ORDER — INSULIN GLARGINE-YFGN 100 [IU]/ML
60 INJECTION, SOLUTION SUBCUTANEOUS ONCE
Refills: 0 | Status: DISCONTINUED | OUTPATIENT
Start: 2025-07-23 | End: 2025-07-23

## 2025-07-22 RX ORDER — INSULIN LISPRO 100 U/ML
75 INJECTION, SOLUTION INTRAVENOUS; SUBCUTANEOUS
Refills: 0 | Status: DISCONTINUED | OUTPATIENT
Start: 2025-07-22 | End: 2025-07-22

## 2025-07-22 RX ORDER — INSULIN LISPRO 100 U/ML
76 INJECTION, SOLUTION INTRAVENOUS; SUBCUTANEOUS
Refills: 0 | Status: DISCONTINUED | OUTPATIENT
Start: 2025-07-22 | End: 2025-07-23

## 2025-07-22 RX ADMIN — INSULIN LISPRO 68 UNIT(S): 100 INJECTION, SOLUTION INTRAVENOUS; SUBCUTANEOUS at 08:12

## 2025-07-22 RX ADMIN — INSULIN GLARGINE-YFGN 60 UNIT(S): 100 INJECTION, SOLUTION SUBCUTANEOUS at 17:30

## 2025-07-22 RX ADMIN — INSULIN LISPRO 76 UNIT(S): 100 INJECTION, SOLUTION INTRAVENOUS; SUBCUTANEOUS at 12:09

## 2025-07-22 RX ADMIN — INSULIN LISPRO 76 UNIT(S): 100 INJECTION, SOLUTION INTRAVENOUS; SUBCUTANEOUS at 17:29

## 2025-07-23 ENCOUNTER — ASOB RESULT (OUTPATIENT)
Age: 35
End: 2025-07-23

## 2025-07-23 ENCOUNTER — APPOINTMENT (OUTPATIENT)
Dept: ANTEPARTUM | Facility: CLINIC | Age: 35
End: 2025-07-23
Payer: COMMERCIAL

## 2025-07-23 LAB
GLUCOSE BLDC GLUCOMTR-MCNC: 107 MG/DL — HIGH (ref 70–99)
GLUCOSE BLDC GLUCOMTR-MCNC: 125 MG/DL — HIGH (ref 70–99)
GLUCOSE BLDC GLUCOMTR-MCNC: 165 MG/DL — HIGH (ref 70–99)
GLUCOSE BLDC GLUCOMTR-MCNC: 175 MG/DL — HIGH (ref 70–99)
GLUCOSE BLDC GLUCOMTR-MCNC: 86 MG/DL — SIGNIFICANT CHANGE UP (ref 70–99)
GLUCOSE BLDC GLUCOMTR-MCNC: 87 MG/DL — SIGNIFICANT CHANGE UP (ref 70–99)
GLUCOSE BLDC GLUCOMTR-MCNC: 89 MG/DL — SIGNIFICANT CHANGE UP (ref 70–99)
GLUCOSE BLDC GLUCOMTR-MCNC: 91 MG/DL — SIGNIFICANT CHANGE UP (ref 70–99)
GLUCOSE BLDC GLUCOMTR-MCNC: 98 MG/DL — SIGNIFICANT CHANGE UP (ref 70–99)

## 2025-07-23 PROCEDURE — 76819 FETAL BIOPHYS PROFIL W/O NST: CPT | Mod: 26

## 2025-07-23 RX ORDER — INSULIN LISPRO 100 U/ML
70 INJECTION, SOLUTION INTRAVENOUS; SUBCUTANEOUS
Refills: 0 | Status: DISCONTINUED | OUTPATIENT
Start: 2025-07-23 | End: 2025-07-23

## 2025-07-23 RX ADMIN — INSULIN LISPRO 70 UNIT(S): 100 INJECTION, SOLUTION INTRAVENOUS; SUBCUTANEOUS at 08:49

## 2025-07-24 LAB
ALBUMIN SERPL ELPH-MCNC: 2.9 G/DL — LOW (ref 3.3–5)
ALP SERPL-CCNC: 83 U/L — SIGNIFICANT CHANGE UP (ref 40–120)
ALT FLD-CCNC: 23 U/L — SIGNIFICANT CHANGE UP (ref 4–33)
ANION GAP SERPL CALC-SCNC: 13 MMOL/L — SIGNIFICANT CHANGE UP (ref 7–14)
AST SERPL-CCNC: 19 U/L — SIGNIFICANT CHANGE UP (ref 4–32)
BASOPHILS # BLD AUTO: 0.03 K/UL — SIGNIFICANT CHANGE UP (ref 0–0.2)
BASOPHILS NFR BLD AUTO: 0.3 % — SIGNIFICANT CHANGE UP (ref 0–2)
BILIRUB SERPL-MCNC: 0.2 MG/DL — SIGNIFICANT CHANGE UP (ref 0.2–1.2)
BLD GP AB SCN SERPL QL: NEGATIVE — SIGNIFICANT CHANGE UP
BUN SERPL-MCNC: 13 MG/DL — SIGNIFICANT CHANGE UP (ref 7–23)
CALCIUM SERPL-MCNC: 9 MG/DL — SIGNIFICANT CHANGE UP (ref 8.4–10.5)
CHLORIDE SERPL-SCNC: 106 MMOL/L — SIGNIFICANT CHANGE UP (ref 98–107)
CO2 SERPL-SCNC: 20 MMOL/L — LOW (ref 22–31)
CREAT SERPL-MCNC: 0.52 MG/DL — SIGNIFICANT CHANGE UP (ref 0.5–1.3)
EGFR: 123 ML/MIN/1.73M2 — SIGNIFICANT CHANGE UP
EGFR: 123 ML/MIN/1.73M2 — SIGNIFICANT CHANGE UP
EOSINOPHIL # BLD AUTO: 0.16 K/UL — SIGNIFICANT CHANGE UP (ref 0–0.5)
EOSINOPHIL NFR BLD AUTO: 1.6 % — SIGNIFICANT CHANGE UP (ref 0–6)
GLUCOSE BLDC GLUCOMTR-MCNC: 122 MG/DL — HIGH (ref 70–99)
GLUCOSE BLDC GLUCOMTR-MCNC: 129 MG/DL — HIGH (ref 70–99)
GLUCOSE BLDC GLUCOMTR-MCNC: 136 MG/DL — HIGH (ref 70–99)
GLUCOSE BLDC GLUCOMTR-MCNC: 147 MG/DL — HIGH (ref 70–99)
GLUCOSE BLDC GLUCOMTR-MCNC: 153 MG/DL — HIGH (ref 70–99)
GLUCOSE BLDC GLUCOMTR-MCNC: 182 MG/DL — HIGH (ref 70–99)
GLUCOSE BLDC GLUCOMTR-MCNC: 184 MG/DL — HIGH (ref 70–99)
GLUCOSE SERPL-MCNC: 97 MG/DL — SIGNIFICANT CHANGE UP (ref 70–99)
HCT VFR BLD CALC: 36.4 % — SIGNIFICANT CHANGE UP (ref 34.5–45)
HGB BLD-MCNC: 11.5 G/DL — SIGNIFICANT CHANGE UP (ref 11.5–15.5)
IMM GRANULOCYTES # BLD AUTO: 0.11 K/UL — HIGH (ref 0–0.07)
IMM GRANULOCYTES NFR BLD AUTO: 1.1 % — HIGH (ref 0–0.9)
LDH SERPL L TO P-CCNC: 166 U/L — SIGNIFICANT CHANGE UP (ref 135–225)
LYMPHOCYTES # BLD AUTO: 2.48 K/UL — SIGNIFICANT CHANGE UP (ref 1–3.3)
LYMPHOCYTES NFR BLD AUTO: 25 % — SIGNIFICANT CHANGE UP (ref 13–44)
MCHC RBC-ENTMCNC: 28 PG — SIGNIFICANT CHANGE UP (ref 27–34)
MCHC RBC-ENTMCNC: 31.6 G/DL — LOW (ref 32–36)
MCV RBC AUTO: 88.6 FL — SIGNIFICANT CHANGE UP (ref 80–100)
MONOCYTES # BLD AUTO: 1.02 K/UL — HIGH (ref 0–0.9)
MONOCYTES NFR BLD AUTO: 10.3 % — SIGNIFICANT CHANGE UP (ref 2–14)
NEUTROPHILS # BLD AUTO: 6.12 K/UL — SIGNIFICANT CHANGE UP (ref 1.8–7.4)
NEUTROPHILS NFR BLD AUTO: 61.7 % — SIGNIFICANT CHANGE UP (ref 43–77)
NRBC # BLD AUTO: 0 K/UL — SIGNIFICANT CHANGE UP (ref 0–0)
NRBC # FLD: 0 K/UL — SIGNIFICANT CHANGE UP (ref 0–0)
NRBC BLD AUTO-RTO: 0 /100 WBCS — SIGNIFICANT CHANGE UP (ref 0–0)
PLATELET # BLD AUTO: 220 K/UL — SIGNIFICANT CHANGE UP (ref 150–400)
PMV BLD: 11 FL — SIGNIFICANT CHANGE UP (ref 7–13)
POTASSIUM SERPL-MCNC: 4 MMOL/L — SIGNIFICANT CHANGE UP (ref 3.5–5.3)
POTASSIUM SERPL-SCNC: 4 MMOL/L — SIGNIFICANT CHANGE UP (ref 3.5–5.3)
PROT SERPL-MCNC: 6.7 G/DL — SIGNIFICANT CHANGE UP (ref 6–8.3)
RBC # BLD: 4.11 M/UL — SIGNIFICANT CHANGE UP (ref 3.8–5.2)
RBC # FLD: 14.4 % — SIGNIFICANT CHANGE UP (ref 10.3–14.5)
RH IG SCN BLD-IMP: POSITIVE — SIGNIFICANT CHANGE UP
SODIUM SERPL-SCNC: 139 MMOL/L — SIGNIFICANT CHANGE UP (ref 135–145)
URATE SERPL-MCNC: 6.2 MG/DL — SIGNIFICANT CHANGE UP (ref 2.5–7)
WBC # BLD: 9.92 K/UL — SIGNIFICANT CHANGE UP (ref 3.8–10.5)
WBC # FLD AUTO: 9.92 K/UL — SIGNIFICANT CHANGE UP (ref 3.8–10.5)

## 2025-07-24 RX ORDER — METOCLOPRAMIDE HCL 10 MG
10 TABLET ORAL ONCE
Refills: 0 | Status: DISCONTINUED | OUTPATIENT
Start: 2025-07-24 | End: 2025-07-27

## 2025-07-24 RX ORDER — CITRIC ACID/SODIUM CITRATE 300-500 MG
30 SOLUTION, ORAL ORAL ONCE
Refills: 0 | Status: COMPLETED | OUTPATIENT
Start: 2025-07-24 | End: 2025-07-24

## 2025-07-24 RX ORDER — ACETAMINOPHEN 500 MG/5ML
1000 LIQUID (ML) ORAL ONCE
Refills: 0 | Status: COMPLETED | OUTPATIENT
Start: 2025-07-24 | End: 2025-07-24

## 2025-07-24 RX ORDER — DIPHENHYDRAMINE HCL 12.5MG/5ML
25 ELIXIR ORAL ONCE
Refills: 0 | Status: COMPLETED | OUTPATIENT
Start: 2025-07-24 | End: 2025-07-24

## 2025-07-24 RX ORDER — INSULIN LISPRO 100 U/ML
75 INJECTION, SOLUTION INTRAVENOUS; SUBCUTANEOUS
Refills: 0 | Status: DISCONTINUED | OUTPATIENT
Start: 2025-07-24 | End: 2025-07-25

## 2025-07-24 RX ORDER — INSULIN GLARGINE-YFGN 100 [IU]/ML
60 INJECTION, SOLUTION SUBCUTANEOUS AT BEDTIME
Refills: 0 | Status: DISCONTINUED | OUTPATIENT
Start: 2025-07-24 | End: 2025-07-25

## 2025-07-24 RX ADMIN — INSULIN LISPRO 75 UNIT(S): 100 INJECTION, SOLUTION INTRAVENOUS; SUBCUTANEOUS at 12:37

## 2025-07-24 RX ADMIN — Medication 30 MILLILITER(S): at 23:54

## 2025-07-24 RX ADMIN — Medication 1000 MILLIGRAM(S): at 08:50

## 2025-07-24 RX ADMIN — INSULIN GLARGINE-YFGN 60 UNIT(S): 100 INJECTION, SOLUTION SUBCUTANEOUS at 22:58

## 2025-07-24 RX ADMIN — Medication 20 MILLIGRAM(S): at 23:34

## 2025-07-24 RX ADMIN — Medication 1000 MILLIGRAM(S): at 08:22

## 2025-07-24 RX ADMIN — INSULIN LISPRO 75 UNIT(S): 100 INJECTION, SOLUTION INTRAVENOUS; SUBCUTANEOUS at 18:01

## 2025-07-24 RX ADMIN — Medication 400 MILLIGRAM(S): at 23:40

## 2025-07-25 ENCOUNTER — ASOB RESULT (OUTPATIENT)
Age: 35
End: 2025-07-25

## 2025-07-25 ENCOUNTER — APPOINTMENT (OUTPATIENT)
Dept: ANTEPARTUM | Facility: CLINIC | Age: 35
End: 2025-07-25
Payer: COMMERCIAL

## 2025-07-25 ENCOUNTER — APPOINTMENT (OUTPATIENT)
Dept: ANTEPARTUM | Facility: CLINIC | Age: 35
End: 2025-07-25

## 2025-07-25 ENCOUNTER — APPOINTMENT (OUTPATIENT)
Dept: MATERNAL FETAL MEDICINE | Facility: CLINIC | Age: 35
End: 2025-07-25

## 2025-07-25 LAB
GLUCOSE BLDC GLUCOMTR-MCNC: 100 MG/DL — HIGH (ref 70–99)
GLUCOSE BLDC GLUCOMTR-MCNC: 109 MG/DL — HIGH (ref 70–99)
GLUCOSE BLDC GLUCOMTR-MCNC: 143 MG/DL — HIGH (ref 70–99)
GLUCOSE BLDC GLUCOMTR-MCNC: 191 MG/DL — HIGH (ref 70–99)
GLUCOSE BLDC GLUCOMTR-MCNC: 64 MG/DL — LOW (ref 70–99)
GLUCOSE BLDC GLUCOMTR-MCNC: 72 MG/DL — SIGNIFICANT CHANGE UP (ref 70–99)
GLUCOSE BLDC GLUCOMTR-MCNC: 74 MG/DL — SIGNIFICANT CHANGE UP (ref 70–99)
GLUCOSE BLDC GLUCOMTR-MCNC: 75 MG/DL — SIGNIFICANT CHANGE UP (ref 70–99)
GLUCOSE BLDC GLUCOMTR-MCNC: 98 MG/DL — SIGNIFICANT CHANGE UP (ref 70–99)
GLUCOSE BLDC GLUCOMTR-MCNC: 99 MG/DL — SIGNIFICANT CHANGE UP (ref 70–99)

## 2025-07-25 PROCEDURE — 76819 FETAL BIOPHYS PROFIL W/O NST: CPT | Mod: 26

## 2025-07-25 PROCEDURE — 99233 SBSQ HOSP IP/OBS HIGH 50: CPT | Mod: GC

## 2025-07-25 RX ORDER — INSULIN LISPRO 100 U/ML
75 INJECTION, SOLUTION INTRAVENOUS; SUBCUTANEOUS
Refills: 0 | Status: DISCONTINUED | OUTPATIENT
Start: 2025-07-25 | End: 2025-07-27

## 2025-07-25 RX ORDER — INSULIN LISPRO 100 U/ML
80 INJECTION, SOLUTION INTRAVENOUS; SUBCUTANEOUS
Refills: 0 | Status: DISCONTINUED | OUTPATIENT
Start: 2025-07-25 | End: 2025-07-25

## 2025-07-25 RX ORDER — INSULIN GLARGINE-YFGN 100 [IU]/ML
68 INJECTION, SOLUTION SUBCUTANEOUS AT BEDTIME
Refills: 0 | Status: DISCONTINUED | OUTPATIENT
Start: 2025-07-25 | End: 2025-07-27

## 2025-07-25 RX ORDER — ACETAMINOPHEN 500 MG/5ML
1000 LIQUID (ML) ORAL ONCE
Refills: 0 | Status: COMPLETED | OUTPATIENT
Start: 2025-07-25 | End: 2025-07-25

## 2025-07-25 RX ADMIN — INSULIN LISPRO 75 UNIT(S): 100 INJECTION, SOLUTION INTRAVENOUS; SUBCUTANEOUS at 17:54

## 2025-07-25 RX ADMIN — INSULIN GLARGINE-YFGN 68 UNIT(S): 100 INJECTION, SOLUTION SUBCUTANEOUS at 23:59

## 2025-07-25 RX ADMIN — INSULIN LISPRO 75 UNIT(S): 100 INJECTION, SOLUTION INTRAVENOUS; SUBCUTANEOUS at 08:41

## 2025-07-25 RX ADMIN — INSULIN LISPRO 80 UNIT(S): 100 INJECTION, SOLUTION INTRAVENOUS; SUBCUTANEOUS at 13:19

## 2025-07-25 RX ADMIN — Medication 400 MILLIGRAM(S): at 23:58

## 2025-07-25 RX ADMIN — Medication 1000 MILLIGRAM(S): at 00:10

## 2025-07-26 LAB
ALBUMIN SERPL ELPH-MCNC: 3 G/DL — LOW (ref 3.3–5)
ALP SERPL-CCNC: 88 U/L — SIGNIFICANT CHANGE UP (ref 40–120)
ALT FLD-CCNC: 25 U/L — SIGNIFICANT CHANGE UP (ref 4–33)
ANION GAP SERPL CALC-SCNC: 13 MMOL/L — SIGNIFICANT CHANGE UP (ref 7–14)
AST SERPL-CCNC: 16 U/L — SIGNIFICANT CHANGE UP (ref 4–32)
BASOPHILS # BLD AUTO: 0.02 K/UL — SIGNIFICANT CHANGE UP (ref 0–0.2)
BASOPHILS NFR BLD AUTO: 0.2 % — SIGNIFICANT CHANGE UP (ref 0–2)
BILIRUB SERPL-MCNC: <0.2 MG/DL — SIGNIFICANT CHANGE UP (ref 0.2–1.2)
BLD GP AB SCN SERPL QL: NEGATIVE — SIGNIFICANT CHANGE UP
BUN SERPL-MCNC: 13 MG/DL — SIGNIFICANT CHANGE UP (ref 7–23)
CALCIUM SERPL-MCNC: 9.2 MG/DL — SIGNIFICANT CHANGE UP (ref 8.4–10.5)
CHLORIDE SERPL-SCNC: 103 MMOL/L — SIGNIFICANT CHANGE UP (ref 98–107)
CO2 SERPL-SCNC: 20 MMOL/L — LOW (ref 22–31)
CREAT SERPL-MCNC: 0.52 MG/DL — SIGNIFICANT CHANGE UP (ref 0.5–1.3)
EGFR: 123 ML/MIN/1.73M2 — SIGNIFICANT CHANGE UP
EGFR: 123 ML/MIN/1.73M2 — SIGNIFICANT CHANGE UP
EOSINOPHIL # BLD AUTO: 0.19 K/UL — SIGNIFICANT CHANGE UP (ref 0–0.5)
EOSINOPHIL NFR BLD AUTO: 2.1 % — SIGNIFICANT CHANGE UP (ref 0–6)
GLUCOSE BLDC GLUCOMTR-MCNC: 101 MG/DL — HIGH (ref 70–99)
GLUCOSE BLDC GLUCOMTR-MCNC: 101 MG/DL — HIGH (ref 70–99)
GLUCOSE BLDC GLUCOMTR-MCNC: 121 MG/DL — HIGH (ref 70–99)
GLUCOSE BLDC GLUCOMTR-MCNC: 134 MG/DL — HIGH (ref 70–99)
GLUCOSE BLDC GLUCOMTR-MCNC: 141 MG/DL — HIGH (ref 70–99)
GLUCOSE BLDC GLUCOMTR-MCNC: 167 MG/DL — HIGH (ref 70–99)
GLUCOSE BLDC GLUCOMTR-MCNC: 202 MG/DL — HIGH (ref 70–99)
GLUCOSE SERPL-MCNC: 95 MG/DL — SIGNIFICANT CHANGE UP (ref 70–99)
HCT VFR BLD CALC: 36 % — SIGNIFICANT CHANGE UP (ref 34.5–45)
HGB BLD-MCNC: 11.7 G/DL — SIGNIFICANT CHANGE UP (ref 11.5–15.5)
IMM GRANULOCYTES # BLD AUTO: 0.07 K/UL — SIGNIFICANT CHANGE UP (ref 0–0.07)
IMM GRANULOCYTES NFR BLD AUTO: 0.8 % — SIGNIFICANT CHANGE UP (ref 0–0.9)
LDH SERPL L TO P-CCNC: 198 U/L — SIGNIFICANT CHANGE UP (ref 135–225)
LYMPHOCYTES # BLD AUTO: 2.64 K/UL — SIGNIFICANT CHANGE UP (ref 1–3.3)
LYMPHOCYTES NFR BLD AUTO: 29 % — SIGNIFICANT CHANGE UP (ref 13–44)
MCHC RBC-ENTMCNC: 28.4 PG — SIGNIFICANT CHANGE UP (ref 27–34)
MCHC RBC-ENTMCNC: 32.5 G/DL — SIGNIFICANT CHANGE UP (ref 32–36)
MCV RBC AUTO: 87.4 FL — SIGNIFICANT CHANGE UP (ref 80–100)
MONOCYTES # BLD AUTO: 0.81 K/UL — SIGNIFICANT CHANGE UP (ref 0–0.9)
MONOCYTES NFR BLD AUTO: 8.9 % — SIGNIFICANT CHANGE UP (ref 2–14)
NEUTROPHILS # BLD AUTO: 5.37 K/UL — SIGNIFICANT CHANGE UP (ref 1.8–7.4)
NEUTROPHILS NFR BLD AUTO: 59 % — SIGNIFICANT CHANGE UP (ref 43–77)
NRBC # BLD AUTO: 0 K/UL — SIGNIFICANT CHANGE UP (ref 0–0)
NRBC # FLD: 0 K/UL — SIGNIFICANT CHANGE UP (ref 0–0)
NRBC BLD AUTO-RTO: 0 /100 WBCS — SIGNIFICANT CHANGE UP (ref 0–0)
PLATELET # BLD AUTO: 221 K/UL — SIGNIFICANT CHANGE UP (ref 150–400)
PMV BLD: 10.6 FL — SIGNIFICANT CHANGE UP (ref 7–13)
POTASSIUM SERPL-MCNC: 4.2 MMOL/L — SIGNIFICANT CHANGE UP (ref 3.5–5.3)
POTASSIUM SERPL-SCNC: 4.2 MMOL/L — SIGNIFICANT CHANGE UP (ref 3.5–5.3)
PROT SERPL-MCNC: 6.8 G/DL — SIGNIFICANT CHANGE UP (ref 6–8.3)
RBC # BLD: 4.12 M/UL — SIGNIFICANT CHANGE UP (ref 3.8–5.2)
RBC # FLD: 14.5 % — SIGNIFICANT CHANGE UP (ref 10.3–14.5)
RH IG SCN BLD-IMP: POSITIVE — SIGNIFICANT CHANGE UP
SODIUM SERPL-SCNC: 136 MMOL/L — SIGNIFICANT CHANGE UP (ref 135–145)
URATE SERPL-MCNC: 5.7 MG/DL — SIGNIFICANT CHANGE UP (ref 2.5–7)
WBC # BLD: 9.1 K/UL — SIGNIFICANT CHANGE UP (ref 3.8–10.5)
WBC # FLD AUTO: 9.1 K/UL — SIGNIFICANT CHANGE UP (ref 3.8–10.5)

## 2025-07-26 PROCEDURE — 99233 SBSQ HOSP IP/OBS HIGH 50: CPT | Mod: GC

## 2025-07-26 RX ADMIN — INSULIN LISPRO 75 UNIT(S): 100 INJECTION, SOLUTION INTRAVENOUS; SUBCUTANEOUS at 18:20

## 2025-07-26 RX ADMIN — Medication 1000 MILLIGRAM(S): at 01:39

## 2025-07-26 RX ADMIN — INSULIN GLARGINE-YFGN 68 UNIT(S): 100 INJECTION, SOLUTION SUBCUTANEOUS at 22:45

## 2025-07-26 RX ADMIN — INSULIN LISPRO 75 UNIT(S): 100 INJECTION, SOLUTION INTRAVENOUS; SUBCUTANEOUS at 12:12

## 2025-07-26 RX ADMIN — INSULIN LISPRO 75 UNIT(S): 100 INJECTION, SOLUTION INTRAVENOUS; SUBCUTANEOUS at 08:23

## 2025-07-27 VITALS
DIASTOLIC BLOOD PRESSURE: 80 MMHG | RESPIRATION RATE: 16 BRPM | SYSTOLIC BLOOD PRESSURE: 134 MMHG | HEART RATE: 101 BPM | TEMPERATURE: 98 F | OXYGEN SATURATION: 99 %

## 2025-07-27 LAB
GLUCOSE BLDC GLUCOMTR-MCNC: 148 MG/DL — HIGH (ref 70–99)
GLUCOSE BLDC GLUCOMTR-MCNC: 95 MG/DL — SIGNIFICANT CHANGE UP (ref 70–99)

## 2025-07-27 PROCEDURE — 99238 HOSP IP/OBS DSCHRG MGMT 30/<: CPT | Mod: GC

## 2025-07-27 RX ORDER — INSULIN GLARGINE-YFGN 100 [IU]/ML
68 INJECTION, SOLUTION SUBCUTANEOUS
Qty: 3 | Refills: 0
Start: 2025-07-27 | End: 2025-08-05

## 2025-07-27 RX ORDER — FOLIC ACID 1 MG/1
1 TABLET ORAL
Qty: 0 | Refills: 0 | DISCHARGE
Start: 2025-07-27

## 2025-07-27 RX ORDER — INSULIN ASPART 100 [IU]/ML
75 INJECTION, SOLUTION INTRAVENOUS; SUBCUTANEOUS
Qty: 5 | Refills: 0
Start: 2025-07-27

## 2025-07-27 RX ADMIN — INSULIN LISPRO 75 UNIT(S): 100 INJECTION, SOLUTION INTRAVENOUS; SUBCUTANEOUS at 08:29

## 2025-07-27 RX ADMIN — INSULIN LISPRO 75 UNIT(S): 100 INJECTION, SOLUTION INTRAVENOUS; SUBCUTANEOUS at 12:18

## 2025-07-28 ENCOUNTER — ASOB RESULT (OUTPATIENT)
Age: 35
End: 2025-07-28

## 2025-07-28 ENCOUNTER — APPOINTMENT (OUTPATIENT)
Dept: ANTEPARTUM | Facility: CLINIC | Age: 35
End: 2025-07-28
Payer: COMMERCIAL

## 2025-07-28 PROCEDURE — 76819 FETAL BIOPHYS PROFIL W/O NST: CPT

## 2025-07-29 ENCOUNTER — APPOINTMENT (OUTPATIENT)
Dept: OBGYN | Facility: CLINIC | Age: 35
End: 2025-07-29
Payer: COMMERCIAL

## 2025-07-29 VITALS
HEIGHT: 64 IN | SYSTOLIC BLOOD PRESSURE: 130 MMHG | BODY MASS INDEX: 37.05 KG/M2 | WEIGHT: 217 LBS | DIASTOLIC BLOOD PRESSURE: 89 MMHG | HEART RATE: 98 BPM

## 2025-07-29 DIAGNOSIS — O13.2 GESTATIONAL [PREGNANCY-INDUCED] HYPERTENSION W/OUT SIGNIFICANT PROTEINURIA, SECOND TRIMESTER: ICD-10-CM

## 2025-07-29 PROCEDURE — 0502F SUBSEQUENT PRENATAL CARE: CPT

## 2025-07-31 ENCOUNTER — APPOINTMENT (OUTPATIENT)
Dept: ANTEPARTUM | Facility: CLINIC | Age: 35
End: 2025-07-31
Payer: COMMERCIAL

## 2025-07-31 ENCOUNTER — ASOB RESULT (OUTPATIENT)
Age: 35
End: 2025-07-31

## 2025-07-31 PROCEDURE — 76819 FETAL BIOPHYS PROFIL W/O NST: CPT

## 2025-08-01 ENCOUNTER — APPOINTMENT (OUTPATIENT)
Dept: ANTEPARTUM | Facility: CLINIC | Age: 35
End: 2025-08-01

## 2025-08-04 ENCOUNTER — APPOINTMENT (OUTPATIENT)
Dept: ANTEPARTUM | Facility: CLINIC | Age: 35
End: 2025-08-04
Payer: COMMERCIAL

## 2025-08-04 ENCOUNTER — ASOB RESULT (OUTPATIENT)
Age: 35
End: 2025-08-04

## 2025-08-04 PROCEDURE — 76818 FETAL BIOPHYS PROFILE W/NST: CPT

## 2025-08-04 PROCEDURE — 76816 OB US FOLLOW-UP PER FETUS: CPT

## 2025-08-05 ENCOUNTER — APPOINTMENT (OUTPATIENT)
Dept: OBGYN | Facility: CLINIC | Age: 35
End: 2025-08-05
Payer: COMMERCIAL

## 2025-08-05 ENCOUNTER — APPOINTMENT (OUTPATIENT)
Dept: MATERNAL FETAL MEDICINE | Facility: CLINIC | Age: 35
End: 2025-08-05
Payer: COMMERCIAL

## 2025-08-05 ENCOUNTER — ASOB RESULT (OUTPATIENT)
Age: 35
End: 2025-08-05

## 2025-08-05 VITALS — SYSTOLIC BLOOD PRESSURE: 143 MMHG | DIASTOLIC BLOOD PRESSURE: 79 MMHG

## 2025-08-05 VITALS
BODY MASS INDEX: 38.24 KG/M2 | HEIGHT: 64 IN | SYSTOLIC BLOOD PRESSURE: 148 MMHG | DIASTOLIC BLOOD PRESSURE: 88 MMHG | HEART RATE: 114 BPM | WEIGHT: 224 LBS

## 2025-08-05 PROCEDURE — 59426 ANTEPARTUM CARE ONLY: CPT

## 2025-08-05 PROCEDURE — G0108 DIAB MANAGE TRN  PER INDIV: CPT | Mod: 95

## 2025-08-05 PROCEDURE — 0502F SUBSEQUENT PRENATAL CARE: CPT

## 2025-08-07 ENCOUNTER — APPOINTMENT (OUTPATIENT)
Dept: ANTEPARTUM | Facility: CLINIC | Age: 35
End: 2025-08-07

## 2025-08-07 ENCOUNTER — NON-APPOINTMENT (OUTPATIENT)
Age: 35
End: 2025-08-07

## 2025-08-07 ENCOUNTER — OUTPATIENT (OUTPATIENT)
Dept: INPATIENT UNIT | Facility: HOSPITAL | Age: 35
LOS: 1 days | End: 2025-08-07
Payer: COMMERCIAL

## 2025-08-07 ENCOUNTER — ASOB RESULT (OUTPATIENT)
Age: 35
End: 2025-08-07

## 2025-08-07 ENCOUNTER — APPOINTMENT (OUTPATIENT)
Dept: ANTEPARTUM | Facility: CLINIC | Age: 35
End: 2025-08-07
Payer: COMMERCIAL

## 2025-08-07 VITALS
HEART RATE: 101 BPM | TEMPERATURE: 99 F | SYSTOLIC BLOOD PRESSURE: 140 MMHG | DIASTOLIC BLOOD PRESSURE: 85 MMHG | RESPIRATION RATE: 16 BRPM | OXYGEN SATURATION: 100 %

## 2025-08-07 VITALS — HEART RATE: 102 BPM | SYSTOLIC BLOOD PRESSURE: 137 MMHG | DIASTOLIC BLOOD PRESSURE: 78 MMHG

## 2025-08-07 DIAGNOSIS — O26.899 OTHER SPECIFIED PREGNANCY RELATED CONDITIONS, UNSPECIFIED TRIMESTER: ICD-10-CM

## 2025-08-07 LAB
ALBUMIN SERPL ELPH-MCNC: 3.3 G/DL — SIGNIFICANT CHANGE UP (ref 3.3–5)
ALP SERPL-CCNC: 118 U/L — SIGNIFICANT CHANGE UP (ref 40–120)
ALT FLD-CCNC: 24 U/L — SIGNIFICANT CHANGE UP (ref 4–33)
ANION GAP SERPL CALC-SCNC: 15 MMOL/L — HIGH (ref 7–14)
APPEARANCE UR: CLEAR — SIGNIFICANT CHANGE UP
AST SERPL-CCNC: 18 U/L — SIGNIFICANT CHANGE UP (ref 4–32)
BASOPHILS # BLD AUTO: 0.03 K/UL — SIGNIFICANT CHANGE UP (ref 0–0.2)
BASOPHILS NFR BLD AUTO: 0.3 % — SIGNIFICANT CHANGE UP (ref 0–2)
BILIRUB SERPL-MCNC: <0.2 MG/DL — SIGNIFICANT CHANGE UP (ref 0.2–1.2)
BILIRUB UR-MCNC: NEGATIVE — SIGNIFICANT CHANGE UP
BLD GP AB SCN SERPL QL: NEGATIVE — SIGNIFICANT CHANGE UP
BUN SERPL-MCNC: 18 MG/DL — SIGNIFICANT CHANGE UP (ref 7–23)
CALCIUM SERPL-MCNC: 9.1 MG/DL — SIGNIFICANT CHANGE UP (ref 8.4–10.5)
CHLORIDE SERPL-SCNC: 103 MMOL/L — SIGNIFICANT CHANGE UP (ref 98–107)
CO2 SERPL-SCNC: 21 MMOL/L — LOW (ref 22–31)
COLOR SPEC: YELLOW — SIGNIFICANT CHANGE UP
CREAT ?TM UR-MCNC: 36 MG/DL — SIGNIFICANT CHANGE UP
CREAT SERPL-MCNC: 0.6 MG/DL — SIGNIFICANT CHANGE UP (ref 0.5–1.3)
DIFF PNL FLD: NEGATIVE — SIGNIFICANT CHANGE UP
EGFR: 119 ML/MIN/1.73M2 — SIGNIFICANT CHANGE UP
EGFR: 119 ML/MIN/1.73M2 — SIGNIFICANT CHANGE UP
EOSINOPHIL # BLD AUTO: 0.15 K/UL — SIGNIFICANT CHANGE UP (ref 0–0.5)
EOSINOPHIL NFR BLD AUTO: 1.5 % — SIGNIFICANT CHANGE UP (ref 0–6)
GLUCOSE BLDC GLUCOMTR-MCNC: 74 MG/DL — SIGNIFICANT CHANGE UP (ref 70–99)
GLUCOSE SERPL-MCNC: 103 MG/DL — HIGH (ref 70–99)
GLUCOSE UR QL: NEGATIVE MG/DL — SIGNIFICANT CHANGE UP
HCT VFR BLD CALC: 34.6 % — SIGNIFICANT CHANGE UP (ref 34.5–45)
HGB BLD-MCNC: 11.3 G/DL — LOW (ref 11.5–15.5)
IMM GRANULOCYTES # BLD AUTO: 0.08 K/UL — HIGH (ref 0–0.07)
IMM GRANULOCYTES NFR BLD AUTO: 0.8 % — SIGNIFICANT CHANGE UP (ref 0–0.9)
KETONES UR QL: NEGATIVE MG/DL — SIGNIFICANT CHANGE UP
LDH SERPL L TO P-CCNC: 180 U/L — SIGNIFICANT CHANGE UP (ref 135–225)
LEUKOCYTE ESTERASE UR-ACNC: NEGATIVE — SIGNIFICANT CHANGE UP
LYMPHOCYTES # BLD AUTO: 2.08 K/UL — SIGNIFICANT CHANGE UP (ref 1–3.3)
LYMPHOCYTES NFR BLD AUTO: 20.4 % — SIGNIFICANT CHANGE UP (ref 13–44)
MCHC RBC-ENTMCNC: 28.3 PG — SIGNIFICANT CHANGE UP (ref 27–34)
MCHC RBC-ENTMCNC: 32.7 G/DL — SIGNIFICANT CHANGE UP (ref 32–36)
MCV RBC AUTO: 86.7 FL — SIGNIFICANT CHANGE UP (ref 80–100)
MONOCYTES # BLD AUTO: 0.92 K/UL — HIGH (ref 0–0.9)
MONOCYTES NFR BLD AUTO: 9 % — SIGNIFICANT CHANGE UP (ref 2–14)
NEUTROPHILS # BLD AUTO: 6.93 K/UL — SIGNIFICANT CHANGE UP (ref 1.8–7.4)
NEUTROPHILS NFR BLD AUTO: 68 % — SIGNIFICANT CHANGE UP (ref 43–77)
NITRITE UR-MCNC: NEGATIVE — SIGNIFICANT CHANGE UP
NRBC # BLD AUTO: 0 K/UL — SIGNIFICANT CHANGE UP (ref 0–0)
NRBC # FLD: 0 K/UL — SIGNIFICANT CHANGE UP (ref 0–0)
NRBC BLD AUTO-RTO: 0 /100 WBCS — SIGNIFICANT CHANGE UP (ref 0–0)
PH UR: 6 — SIGNIFICANT CHANGE UP (ref 5–8)
PLATELET # BLD AUTO: 219 K/UL — SIGNIFICANT CHANGE UP (ref 150–400)
PMV BLD: 11.4 FL — SIGNIFICANT CHANGE UP (ref 7–13)
POTASSIUM SERPL-MCNC: 3.8 MMOL/L — SIGNIFICANT CHANGE UP (ref 3.5–5.3)
POTASSIUM SERPL-SCNC: 3.8 MMOL/L — SIGNIFICANT CHANGE UP (ref 3.5–5.3)
PROT ?TM UR-MCNC: 4 MG/DL — SIGNIFICANT CHANGE UP
PROT SERPL-MCNC: 7.2 G/DL — SIGNIFICANT CHANGE UP (ref 6–8.3)
PROT UR-MCNC: NEGATIVE MG/DL — SIGNIFICANT CHANGE UP
PROT/CREAT UR-RTO: 0.1 RATIO — SIGNIFICANT CHANGE UP (ref 0–0.2)
RBC # BLD: 3.99 M/UL — SIGNIFICANT CHANGE UP (ref 3.8–5.2)
RBC # FLD: 14.4 % — SIGNIFICANT CHANGE UP (ref 10.3–14.5)
RH IG SCN BLD-IMP: POSITIVE — SIGNIFICANT CHANGE UP
SODIUM SERPL-SCNC: 139 MMOL/L — SIGNIFICANT CHANGE UP (ref 135–145)
SP GR SPEC: 1.01 — SIGNIFICANT CHANGE UP (ref 1–1.03)
URATE SERPL-MCNC: 6.2 MG/DL — SIGNIFICANT CHANGE UP (ref 2.5–7)
UROBILINOGEN FLD QL: 0.2 MG/DL — SIGNIFICANT CHANGE UP (ref 0.2–1)
WBC # BLD: 10.19 K/UL — SIGNIFICANT CHANGE UP (ref 3.8–10.5)
WBC # FLD AUTO: 10.19 K/UL — SIGNIFICANT CHANGE UP (ref 3.8–10.5)

## 2025-08-07 PROCEDURE — 99215 OFFICE O/P EST HI 40 MIN: CPT | Mod: 25

## 2025-08-07 PROCEDURE — 99222 1ST HOSP IP/OBS MODERATE 55: CPT | Mod: 25

## 2025-08-07 PROCEDURE — 59025 FETAL NON-STRESS TEST: CPT | Mod: 26

## 2025-08-07 PROCEDURE — 76818 FETAL BIOPHYS PROFILE W/NST: CPT

## 2025-08-08 LAB — T PALLIDUM AB TITR SER: NEGATIVE — SIGNIFICANT CHANGE UP

## 2025-08-11 ENCOUNTER — ASOB RESULT (OUTPATIENT)
Age: 35
End: 2025-08-11

## 2025-08-11 ENCOUNTER — APPOINTMENT (OUTPATIENT)
Dept: ANTEPARTUM | Facility: CLINIC | Age: 35
End: 2025-08-11
Payer: COMMERCIAL

## 2025-08-11 ENCOUNTER — TRANSCRIPTION ENCOUNTER (OUTPATIENT)
Age: 35
End: 2025-08-11

## 2025-08-11 ENCOUNTER — APPOINTMENT (OUTPATIENT)
Dept: ANTEPARTUM | Facility: CLINIC | Age: 35
End: 2025-08-11

## 2025-08-11 DIAGNOSIS — O09.513 SUPERVISION OF ELDERLY PRIMIGRAVIDA, THIRD TRIMESTER: ICD-10-CM

## 2025-08-11 DIAGNOSIS — G43.909 MIGRAINE, UNSPECIFIED, NOT INTRACTABLE, WITHOUT STATUS MIGRAINOSUS: ICD-10-CM

## 2025-08-11 DIAGNOSIS — G47.33 OBSTRUCTIVE SLEEP APNEA (ADULT) (PEDIATRIC): ICD-10-CM

## 2025-08-11 DIAGNOSIS — D25.9 LEIOMYOMA OF UTERUS, UNSPECIFIED: ICD-10-CM

## 2025-08-11 DIAGNOSIS — Z3A.36 36 WEEKS GESTATION OF PREGNANCY: ICD-10-CM

## 2025-08-11 DIAGNOSIS — O34.13 MATERNAL CARE FOR BENIGN TUMOR OF CORPUS UTERI, THIRD TRIMESTER: ICD-10-CM

## 2025-08-11 DIAGNOSIS — Z79.4 LONG TERM (CURRENT) USE OF INSULIN: ICD-10-CM

## 2025-08-11 DIAGNOSIS — F41.9 ANXIETY DISORDER, UNSPECIFIED: ICD-10-CM

## 2025-08-11 DIAGNOSIS — O99.343 OTHER MENTAL DISORDERS COMPLICATING PREGNANCY, THIRD TRIMESTER: ICD-10-CM

## 2025-08-11 DIAGNOSIS — O99.353 DISEASES OF THE NERVOUS SYSTEM COMPLICATING PREGNANCY, THIRD TRIMESTER: ICD-10-CM

## 2025-08-11 DIAGNOSIS — O36.8330 MATERNAL CARE FOR ABNORMALITIES OF THE FETAL HEART RATE OR RHYTHM, THIRD TRIMESTER, NOT APPLICABLE OR UNSPECIFIED: ICD-10-CM

## 2025-08-11 DIAGNOSIS — O13.3 GESTATIONAL [PREGNANCY-INDUCED] HYPERTENSION WITHOUT SIGNIFICANT PROTEINURIA, THIRD TRIMESTER: ICD-10-CM

## 2025-08-11 DIAGNOSIS — O99.283 ENDOCRINE, NUTRITIONAL AND METABOLIC DISEASES COMPLICATING PREGNANCY, THIRD TRIMESTER: ICD-10-CM

## 2025-08-11 DIAGNOSIS — E11.9 TYPE 2 DIABETES MELLITUS WITHOUT COMPLICATIONS: ICD-10-CM

## 2025-08-11 DIAGNOSIS — O24.113 PRE-EXISTING TYPE 2 DIABETES MELLITUS, IN PREGNANCY, THIRD TRIMESTER: ICD-10-CM

## 2025-08-11 PROCEDURE — 76818 FETAL BIOPHYS PROFILE W/NST: CPT

## 2025-08-12 ENCOUNTER — APPOINTMENT (OUTPATIENT)
Dept: OBGYN | Facility: CLINIC | Age: 35
End: 2025-08-12

## 2025-08-14 ENCOUNTER — APPOINTMENT (OUTPATIENT)
Dept: OBGYN | Facility: HOSPITAL | Age: 35
End: 2025-08-14

## 2025-08-14 ENCOUNTER — APPOINTMENT (OUTPATIENT)
Dept: ANTEPARTUM | Facility: CLINIC | Age: 35
End: 2025-08-14

## 2025-08-14 ENCOUNTER — INPATIENT (INPATIENT)
Facility: HOSPITAL | Age: 35
LOS: 6 days | Discharge: HOME | End: 2025-08-21
Attending: OBSTETRICS & GYNECOLOGY | Admitting: OBSTETRICS & GYNECOLOGY
Payer: COMMERCIAL

## 2025-08-14 VITALS — HEART RATE: 108 BPM | SYSTOLIC BLOOD PRESSURE: 157 MMHG | DIASTOLIC BLOOD PRESSURE: 100 MMHG

## 2025-08-14 DIAGNOSIS — Z48.02 ENCOUNTER FOR REMOVAL OF SUTURES: ICD-10-CM

## 2025-08-14 LAB
ALBUMIN SERPL ELPH-MCNC: 2.9 G/DL — LOW (ref 3.3–5)
ALBUMIN SERPL ELPH-MCNC: 3.1 G/DL — LOW (ref 3.3–5)
ALP SERPL-CCNC: 115 U/L — SIGNIFICANT CHANGE UP (ref 40–120)
ALP SERPL-CCNC: 118 U/L — SIGNIFICANT CHANGE UP (ref 40–120)
ALT FLD-CCNC: 23 U/L — SIGNIFICANT CHANGE UP (ref 4–33)
ALT FLD-CCNC: 25 U/L — SIGNIFICANT CHANGE UP (ref 4–33)
ANION GAP SERPL CALC-SCNC: 13 MMOL/L — SIGNIFICANT CHANGE UP (ref 7–14)
ANION GAP SERPL CALC-SCNC: 14 MMOL/L — SIGNIFICANT CHANGE UP (ref 7–14)
APPEARANCE UR: CLEAR — SIGNIFICANT CHANGE UP
APPEARANCE UR: CLEAR — SIGNIFICANT CHANGE UP
AST SERPL-CCNC: 21 U/L — SIGNIFICANT CHANGE UP (ref 4–32)
AST SERPL-CCNC: 29 U/L — SIGNIFICANT CHANGE UP (ref 4–32)
BASOPHILS # BLD AUTO: 0.02 K/UL — SIGNIFICANT CHANGE UP (ref 0–0.2)
BASOPHILS # BLD AUTO: 0.02 K/UL — SIGNIFICANT CHANGE UP (ref 0–0.2)
BASOPHILS NFR BLD AUTO: 0.2 % — SIGNIFICANT CHANGE UP (ref 0–2)
BASOPHILS NFR BLD AUTO: 0.2 % — SIGNIFICANT CHANGE UP (ref 0–2)
BILIRUB SERPL-MCNC: 0.2 MG/DL — SIGNIFICANT CHANGE UP (ref 0.2–1.2)
BILIRUB SERPL-MCNC: <0.2 MG/DL — SIGNIFICANT CHANGE UP (ref 0.2–1.2)
BILIRUB UR-MCNC: NEGATIVE — SIGNIFICANT CHANGE UP
BILIRUB UR-MCNC: NEGATIVE — SIGNIFICANT CHANGE UP
BLD GP AB SCN SERPL QL: NEGATIVE — SIGNIFICANT CHANGE UP
BUN SERPL-MCNC: 12 MG/DL — SIGNIFICANT CHANGE UP (ref 7–23)
BUN SERPL-MCNC: 14 MG/DL — SIGNIFICANT CHANGE UP (ref 7–23)
CALCIUM SERPL-MCNC: 8.5 MG/DL — SIGNIFICANT CHANGE UP (ref 8.4–10.5)
CALCIUM SERPL-MCNC: 8.8 MG/DL — SIGNIFICANT CHANGE UP (ref 8.4–10.5)
CHLORIDE SERPL-SCNC: 104 MMOL/L — SIGNIFICANT CHANGE UP (ref 98–107)
CHLORIDE SERPL-SCNC: 104 MMOL/L — SIGNIFICANT CHANGE UP (ref 98–107)
CO2 SERPL-SCNC: 18 MMOL/L — LOW (ref 22–31)
CO2 SERPL-SCNC: 19 MMOL/L — LOW (ref 22–31)
COLOR SPEC: YELLOW — SIGNIFICANT CHANGE UP
COLOR SPEC: YELLOW — SIGNIFICANT CHANGE UP
CREAT ?TM UR-MCNC: 30 MG/DL — SIGNIFICANT CHANGE UP
CREAT ?TM UR-MCNC: 30 MG/DL — SIGNIFICANT CHANGE UP
CREAT SERPL-MCNC: 0.59 MG/DL — SIGNIFICANT CHANGE UP (ref 0.5–1.3)
CREAT SERPL-MCNC: 0.75 MG/DL — SIGNIFICANT CHANGE UP (ref 0.5–1.3)
DIFF PNL FLD: ABNORMAL
DIFF PNL FLD: NEGATIVE — SIGNIFICANT CHANGE UP
EGFR: 106 ML/MIN/1.73M2 — SIGNIFICANT CHANGE UP
EGFR: 106 ML/MIN/1.73M2 — SIGNIFICANT CHANGE UP
EGFR: 120 ML/MIN/1.73M2 — SIGNIFICANT CHANGE UP
EGFR: 120 ML/MIN/1.73M2 — SIGNIFICANT CHANGE UP
EOSINOPHIL # BLD AUTO: 0.12 K/UL — SIGNIFICANT CHANGE UP (ref 0–0.5)
EOSINOPHIL # BLD AUTO: 0.13 K/UL — SIGNIFICANT CHANGE UP (ref 0–0.5)
EOSINOPHIL NFR BLD AUTO: 1.3 % — SIGNIFICANT CHANGE UP (ref 0–6)
EOSINOPHIL NFR BLD AUTO: 1.4 % — SIGNIFICANT CHANGE UP (ref 0–6)
GLUCOSE BLDC GLUCOMTR-MCNC: 100 MG/DL — HIGH (ref 70–99)
GLUCOSE BLDC GLUCOMTR-MCNC: 107 MG/DL — HIGH (ref 70–99)
GLUCOSE BLDC GLUCOMTR-MCNC: 82 MG/DL — SIGNIFICANT CHANGE UP (ref 70–99)
GLUCOSE BLDC GLUCOMTR-MCNC: 94 MG/DL — SIGNIFICANT CHANGE UP (ref 70–99)
GLUCOSE SERPL-MCNC: 74 MG/DL — SIGNIFICANT CHANGE UP (ref 70–99)
GLUCOSE SERPL-MCNC: 86 MG/DL — SIGNIFICANT CHANGE UP (ref 70–99)
GLUCOSE UR QL: NEGATIVE MG/DL — SIGNIFICANT CHANGE UP
GLUCOSE UR QL: NEGATIVE MG/DL — SIGNIFICANT CHANGE UP
HCT VFR BLD CALC: 32.9 % — LOW (ref 34.5–45)
HCT VFR BLD CALC: 36.5 % — SIGNIFICANT CHANGE UP (ref 34.5–45)
HGB BLD-MCNC: 10.7 G/DL — LOW (ref 11.5–15.5)
HGB BLD-MCNC: 11.7 G/DL — SIGNIFICANT CHANGE UP (ref 11.5–15.5)
IMM GRANULOCYTES # BLD AUTO: 0.06 K/UL — SIGNIFICANT CHANGE UP (ref 0–0.07)
IMM GRANULOCYTES # BLD AUTO: 0.09 K/UL — HIGH (ref 0–0.07)
IMM GRANULOCYTES NFR BLD AUTO: 0.7 % — SIGNIFICANT CHANGE UP (ref 0–0.9)
IMM GRANULOCYTES NFR BLD AUTO: 0.9 % — SIGNIFICANT CHANGE UP (ref 0–0.9)
KETONES UR QL: NEGATIVE MG/DL — SIGNIFICANT CHANGE UP
KETONES UR QL: NEGATIVE MG/DL — SIGNIFICANT CHANGE UP
LDH SERPL L TO P-CCNC: 186 U/L — SIGNIFICANT CHANGE UP (ref 135–225)
LDH SERPL L TO P-CCNC: 336 U/L — HIGH (ref 135–225)
LEUKOCYTE ESTERASE UR-ACNC: ABNORMAL
LEUKOCYTE ESTERASE UR-ACNC: NEGATIVE — SIGNIFICANT CHANGE UP
LYMPHOCYTES # BLD AUTO: 1.57 K/UL — SIGNIFICANT CHANGE UP (ref 1–3.3)
LYMPHOCYTES # BLD AUTO: 1.89 K/UL — SIGNIFICANT CHANGE UP (ref 1–3.3)
LYMPHOCYTES NFR BLD AUTO: 16.5 % — SIGNIFICANT CHANGE UP (ref 13–44)
LYMPHOCYTES NFR BLD AUTO: 20.5 % — SIGNIFICANT CHANGE UP (ref 13–44)
MCHC RBC-ENTMCNC: 28.4 PG — SIGNIFICANT CHANGE UP (ref 27–34)
MCHC RBC-ENTMCNC: 28.4 PG — SIGNIFICANT CHANGE UP (ref 27–34)
MCHC RBC-ENTMCNC: 32.1 G/DL — SIGNIFICANT CHANGE UP (ref 32–36)
MCHC RBC-ENTMCNC: 32.5 G/DL — SIGNIFICANT CHANGE UP (ref 32–36)
MCV RBC AUTO: 87.3 FL — SIGNIFICANT CHANGE UP (ref 80–100)
MCV RBC AUTO: 88.6 FL — SIGNIFICANT CHANGE UP (ref 80–100)
MONOCYTES # BLD AUTO: 0.75 K/UL — SIGNIFICANT CHANGE UP (ref 0–0.9)
MONOCYTES # BLD AUTO: 0.99 K/UL — HIGH (ref 0–0.9)
MONOCYTES NFR BLD AUTO: 10.4 % — SIGNIFICANT CHANGE UP (ref 2–14)
MONOCYTES NFR BLD AUTO: 8.1 % — SIGNIFICANT CHANGE UP (ref 2–14)
NEUTROPHILS # BLD AUTO: 6.37 K/UL — SIGNIFICANT CHANGE UP (ref 1.8–7.4)
NEUTROPHILS # BLD AUTO: 6.74 K/UL — SIGNIFICANT CHANGE UP (ref 1.8–7.4)
NEUTROPHILS NFR BLD AUTO: 69.1 % — SIGNIFICANT CHANGE UP (ref 43–77)
NEUTROPHILS NFR BLD AUTO: 70.7 % — SIGNIFICANT CHANGE UP (ref 43–77)
NITRITE UR-MCNC: NEGATIVE — SIGNIFICANT CHANGE UP
NITRITE UR-MCNC: NEGATIVE — SIGNIFICANT CHANGE UP
NRBC # BLD AUTO: 0 K/UL — SIGNIFICANT CHANGE UP (ref 0–0)
NRBC # BLD AUTO: 0 K/UL — SIGNIFICANT CHANGE UP (ref 0–0)
NRBC # FLD: 0 K/UL — SIGNIFICANT CHANGE UP (ref 0–0)
NRBC # FLD: 0 K/UL — SIGNIFICANT CHANGE UP (ref 0–0)
NRBC BLD AUTO-RTO: 0 /100 WBCS — SIGNIFICANT CHANGE UP (ref 0–0)
NRBC BLD AUTO-RTO: 0 /100 WBCS — SIGNIFICANT CHANGE UP (ref 0–0)
PH UR: 6 — SIGNIFICANT CHANGE UP (ref 5–8)
PH UR: 6 — SIGNIFICANT CHANGE UP (ref 5–8)
PLATELET # BLD AUTO: 185 K/UL — SIGNIFICANT CHANGE UP (ref 150–400)
PLATELET # BLD AUTO: 207 K/UL — SIGNIFICANT CHANGE UP (ref 150–400)
PMV BLD: 11 FL — SIGNIFICANT CHANGE UP (ref 7–13)
PMV BLD: 11.2 FL — SIGNIFICANT CHANGE UP (ref 7–13)
POTASSIUM SERPL-MCNC: 3.7 MMOL/L — SIGNIFICANT CHANGE UP (ref 3.5–5.3)
POTASSIUM SERPL-MCNC: 4.9 MMOL/L — SIGNIFICANT CHANGE UP (ref 3.5–5.3)
POTASSIUM SERPL-SCNC: 3.7 MMOL/L — SIGNIFICANT CHANGE UP (ref 3.5–5.3)
POTASSIUM SERPL-SCNC: 4.9 MMOL/L — SIGNIFICANT CHANGE UP (ref 3.5–5.3)
PROT ?TM UR-MCNC: 20 MG/DL — SIGNIFICANT CHANGE UP
PROT ?TM UR-MCNC: 4 MG/DL — SIGNIFICANT CHANGE UP
PROT SERPL-MCNC: 6.5 G/DL — SIGNIFICANT CHANGE UP (ref 6–8.3)
PROT SERPL-MCNC: 7.2 G/DL — SIGNIFICANT CHANGE UP (ref 6–8.3)
PROT UR-MCNC: NEGATIVE MG/DL — SIGNIFICANT CHANGE UP
PROT UR-MCNC: SIGNIFICANT CHANGE UP MG/DL
PROT/CREAT UR-RTO: 0.1 RATIO — SIGNIFICANT CHANGE UP (ref 0–0.2)
PROT/CREAT UR-RTO: 0.6 RATIO — HIGH (ref 0–0.2)
RBC # BLD: 3.77 M/UL — LOW (ref 3.8–5.2)
RBC # BLD: 4.12 M/UL — SIGNIFICANT CHANGE UP (ref 3.8–5.2)
RBC # FLD: 14.7 % — HIGH (ref 10.3–14.5)
RBC # FLD: 14.8 % — HIGH (ref 10.3–14.5)
RH IG SCN BLD-IMP: POSITIVE — SIGNIFICANT CHANGE UP
SODIUM SERPL-SCNC: 136 MMOL/L — SIGNIFICANT CHANGE UP (ref 135–145)
SODIUM SERPL-SCNC: 136 MMOL/L — SIGNIFICANT CHANGE UP (ref 135–145)
SP GR SPEC: 1.01 — SIGNIFICANT CHANGE UP (ref 1–1.03)
SP GR SPEC: 1.01 — SIGNIFICANT CHANGE UP (ref 1–1.03)
T PALLIDUM AB TITR SER: NEGATIVE — SIGNIFICANT CHANGE UP
URATE SERPL-MCNC: 6.6 MG/DL — SIGNIFICANT CHANGE UP (ref 2.5–7)
URATE SERPL-MCNC: 6.8 MG/DL — SIGNIFICANT CHANGE UP (ref 2.5–7)
UROBILINOGEN FLD QL: 0.2 MG/DL — SIGNIFICANT CHANGE UP (ref 0.2–1)
UROBILINOGEN FLD QL: 0.2 MG/DL — SIGNIFICANT CHANGE UP (ref 0.2–1)
WBC # BLD: 9.22 K/UL — SIGNIFICANT CHANGE UP (ref 3.8–10.5)
WBC # BLD: 9.53 K/UL — SIGNIFICANT CHANGE UP (ref 3.8–10.5)
WBC # FLD AUTO: 9.22 K/UL — SIGNIFICANT CHANGE UP (ref 3.8–10.5)
WBC # FLD AUTO: 9.53 K/UL — SIGNIFICANT CHANGE UP (ref 3.8–10.5)

## 2025-08-14 RX ORDER — OXYTOCIN-SODIUM CHLORIDE 0.9% IV SOLN 30 UNIT/500ML 30-0.9/5 UT/ML-%
SOLUTION INTRAVENOUS
Qty: 30 | Refills: 0 | Status: DISCONTINUED | OUTPATIENT
Start: 2025-08-14 | End: 2025-08-16

## 2025-08-14 RX ORDER — ACETAMINOPHEN 500 MG/5ML
1000 LIQUID (ML) ORAL ONCE
Refills: 0 | Status: DISCONTINUED | OUTPATIENT
Start: 2025-08-14 | End: 2025-08-15

## 2025-08-14 RX ORDER — SODIUM CHLORIDE 9 G/1000ML
1000 INJECTION, SOLUTION INTRAVENOUS
Refills: 0 | Status: DISCONTINUED | OUTPATIENT
Start: 2025-08-14 | End: 2025-08-15

## 2025-08-14 RX ORDER — MAGNESIUM SULFATE 500 MG/ML
4 SYRINGE (ML) INJECTION ONCE
Refills: 0 | Status: COMPLETED | OUTPATIENT
Start: 2025-08-14 | End: 2025-08-14

## 2025-08-14 RX ORDER — NIFEDIPINE 30 MG
30 TABLET, EXTENDED RELEASE 24 HR ORAL DAILY
Refills: 0 | Status: DISCONTINUED | OUTPATIENT
Start: 2025-08-14 | End: 2025-08-15

## 2025-08-14 RX ORDER — OXYTOCIN-SODIUM CHLORIDE 0.9% IV SOLN 30 UNIT/500ML 30-0.9/5 UT/ML-%
SOLUTION INTRAVENOUS
Qty: 30 | Refills: 0 | Status: DISCONTINUED | OUTPATIENT
Start: 2025-08-14 | End: 2025-08-15

## 2025-08-14 RX ORDER — MAGNESIUM SULFATE 500 MG/ML
2 SYRINGE (ML) INJECTION
Qty: 40 | Refills: 0 | Status: DISCONTINUED | OUTPATIENT
Start: 2025-08-14 | End: 2025-08-15

## 2025-08-14 RX ORDER — CITRIC ACID/SODIUM CITRATE 300-500 MG
15 SOLUTION, ORAL ORAL EVERY 6 HOURS
Refills: 0 | Status: DISCONTINUED | OUTPATIENT
Start: 2025-08-14 | End: 2025-08-15

## 2025-08-14 RX ORDER — OXYTOCIN-SODIUM CHLORIDE 0.9% IV SOLN 30 UNIT/500ML 30-0.9/5 UT/ML-%
6 SOLUTION INTRAVENOUS
Qty: 30 | Refills: 0 | Status: DISCONTINUED | OUTPATIENT
Start: 2025-08-14 | End: 2025-08-14

## 2025-08-14 RX ORDER — NIFEDIPINE 30 MG
10 TABLET, EXTENDED RELEASE 24 HR ORAL ONCE
Refills: 0 | Status: COMPLETED | OUTPATIENT
Start: 2025-08-14 | End: 2025-08-14

## 2025-08-14 RX ADMIN — Medication 10 MILLIGRAM(S): at 21:16

## 2025-08-14 RX ADMIN — Medication 300 GRAM(S): at 21:32

## 2025-08-14 RX ADMIN — Medication 30 MILLIGRAM(S): at 22:40

## 2025-08-14 RX ADMIN — Medication 20 MILLIGRAM(S): at 12:41

## 2025-08-14 RX ADMIN — Medication 50 GM/HR: at 21:57

## 2025-08-14 RX ADMIN — Medication 1 APPLICATION(S): at 10:30

## 2025-08-14 RX ADMIN — OXYTOCIN-SODIUM CHLORIDE 0.9% IV SOLN 30 UNIT/500ML 2 MILLIUNIT(S)/MIN: 30-0.9/5 SOLUTION at 19:34

## 2025-08-14 RX ADMIN — OXYTOCIN-SODIUM CHLORIDE 0.9% IV SOLN 30 UNIT/500ML 6 MILLIUNIT(S)/MIN: 30-0.9/5 SOLUTION at 11:51

## 2025-08-14 RX ADMIN — SODIUM CHLORIDE 125 MILLILITER(S): 9 INJECTION, SOLUTION INTRAVENOUS at 11:51

## 2025-08-15 ENCOUNTER — TRANSCRIPTION ENCOUNTER (OUTPATIENT)
Age: 35
End: 2025-08-15

## 2025-08-15 DIAGNOSIS — E11.9 TYPE 2 DIABETES MELLITUS WITHOUT COMPLICATIONS: ICD-10-CM

## 2025-08-15 DIAGNOSIS — E66.3 OVERWEIGHT: ICD-10-CM

## 2025-08-15 DIAGNOSIS — E78.5 HYPERLIPIDEMIA, UNSPECIFIED: ICD-10-CM

## 2025-08-15 LAB
ALBUMIN SERPL ELPH-MCNC: 2.7 G/DL — LOW (ref 3.3–5)
ALP SERPL-CCNC: 111 U/L — SIGNIFICANT CHANGE UP (ref 40–120)
ALT FLD-CCNC: 24 U/L — SIGNIFICANT CHANGE UP (ref 4–33)
ANION GAP SERPL CALC-SCNC: 16 MMOL/L — HIGH (ref 7–14)
AST SERPL-CCNC: 24 U/L — SIGNIFICANT CHANGE UP (ref 4–32)
BACTERIA # UR AUTO: NEGATIVE /HPF — SIGNIFICANT CHANGE UP
BASOPHILS # BLD AUTO: 0.02 K/UL — SIGNIFICANT CHANGE UP (ref 0–0.2)
BASOPHILS NFR BLD AUTO: 0.1 % — SIGNIFICANT CHANGE UP (ref 0–2)
BILIRUB SERPL-MCNC: 0.2 MG/DL — SIGNIFICANT CHANGE UP (ref 0.2–1.2)
BUN SERPL-MCNC: 12 MG/DL — SIGNIFICANT CHANGE UP (ref 7–23)
CALCIUM SERPL-MCNC: 8.2 MG/DL — LOW (ref 8.4–10.5)
CAST: 2 /LPF — SIGNIFICANT CHANGE UP (ref 0–4)
CHLORIDE SERPL-SCNC: 102 MMOL/L — SIGNIFICANT CHANGE UP (ref 98–107)
CO2 SERPL-SCNC: 17 MMOL/L — LOW (ref 22–31)
CREAT SERPL-MCNC: 0.6 MG/DL — SIGNIFICANT CHANGE UP (ref 0.5–1.3)
EGFR: 119 ML/MIN/1.73M2 — SIGNIFICANT CHANGE UP
EGFR: 119 ML/MIN/1.73M2 — SIGNIFICANT CHANGE UP
EOSINOPHIL # BLD AUTO: 0.01 K/UL — SIGNIFICANT CHANGE UP (ref 0–0.5)
EOSINOPHIL NFR BLD AUTO: 0.1 % — SIGNIFICANT CHANGE UP (ref 0–6)
GLUCOSE BLDC GLUCOMTR-MCNC: 101 MG/DL — HIGH (ref 70–99)
GLUCOSE BLDC GLUCOMTR-MCNC: 114 MG/DL — HIGH (ref 70–99)
GLUCOSE BLDC GLUCOMTR-MCNC: 149 MG/DL — HIGH (ref 70–99)
GLUCOSE BLDC GLUCOMTR-MCNC: 183 MG/DL — HIGH (ref 70–99)
GLUCOSE BLDC GLUCOMTR-MCNC: 194 MG/DL — HIGH (ref 70–99)
GLUCOSE BLDC GLUCOMTR-MCNC: 202 MG/DL — HIGH (ref 70–99)
GLUCOSE SERPL-MCNC: 185 MG/DL — HIGH (ref 70–99)
HCT VFR BLD CALC: 32.3 % — LOW (ref 34.5–45)
HGB BLD-MCNC: 10.3 G/DL — LOW (ref 11.5–15.5)
IMM GRANULOCYTES # BLD AUTO: 0.11 K/UL — HIGH (ref 0–0.07)
IMM GRANULOCYTES NFR BLD AUTO: 0.7 % — SIGNIFICANT CHANGE UP (ref 0–0.9)
LDH SERPL L TO P-CCNC: 257 U/L — HIGH (ref 135–225)
LYMPHOCYTES # BLD AUTO: 0.88 K/UL — LOW (ref 1–3.3)
LYMPHOCYTES NFR BLD AUTO: 5.4 % — LOW (ref 13–44)
MAGNESIUM SERPL-MCNC: 3.8 MG/DL — HIGH (ref 1.6–2.6)
MAGNESIUM SERPL-MCNC: 4.6 MG/DL — HIGH (ref 1.6–2.6)
MAGNESIUM SERPL-MCNC: 5.3 MG/DL — HIGH (ref 1.6–2.6)
MCHC RBC-ENTMCNC: 28.5 PG — SIGNIFICANT CHANGE UP (ref 27–34)
MCHC RBC-ENTMCNC: 31.9 G/DL — LOW (ref 32–36)
MCV RBC AUTO: 89.5 FL — SIGNIFICANT CHANGE UP (ref 80–100)
MONOCYTES # BLD AUTO: 0.78 K/UL — SIGNIFICANT CHANGE UP (ref 0–0.9)
MONOCYTES NFR BLD AUTO: 4.8 % — SIGNIFICANT CHANGE UP (ref 2–14)
NEUTROPHILS # BLD AUTO: 14.53 K/UL — HIGH (ref 1.8–7.4)
NEUTROPHILS NFR BLD AUTO: 88.9 % — HIGH (ref 43–77)
NRBC # BLD AUTO: 0 K/UL — SIGNIFICANT CHANGE UP (ref 0–0)
NRBC # FLD: 0 K/UL — SIGNIFICANT CHANGE UP (ref 0–0)
NRBC BLD AUTO-RTO: 0 /100 WBCS — SIGNIFICANT CHANGE UP (ref 0–0)
PLATELET # BLD AUTO: 202 K/UL — SIGNIFICANT CHANGE UP (ref 150–400)
PMV BLD: 11.2 FL — SIGNIFICANT CHANGE UP (ref 7–13)
POTASSIUM SERPL-MCNC: 4.3 MMOL/L — SIGNIFICANT CHANGE UP (ref 3.5–5.3)
POTASSIUM SERPL-SCNC: 4.3 MMOL/L — SIGNIFICANT CHANGE UP (ref 3.5–5.3)
PROT SERPL-MCNC: 6 G/DL — SIGNIFICANT CHANGE UP (ref 6–8.3)
RBC # BLD: 3.61 M/UL — LOW (ref 3.8–5.2)
RBC # FLD: 14.6 % — HIGH (ref 10.3–14.5)
RBC CASTS # UR COMP ASSIST: 265 /HPF — HIGH (ref 0–4)
SODIUM SERPL-SCNC: 135 MMOL/L — SIGNIFICANT CHANGE UP (ref 135–145)
SQUAMOUS # UR AUTO: 1 /HPF — SIGNIFICANT CHANGE UP (ref 0–5)
URATE SERPL-MCNC: 6.9 MG/DL — SIGNIFICANT CHANGE UP (ref 2.5–7)
WBC # BLD: 16.33 K/UL — HIGH (ref 3.8–10.5)
WBC # FLD AUTO: 16.33 K/UL — HIGH (ref 3.8–10.5)
WBC UR QL: 7 /HPF — HIGH (ref 0–5)

## 2025-08-15 PROCEDURE — 59515 CESAREAN DELIVERY: CPT

## 2025-08-15 PROCEDURE — 93010 ELECTROCARDIOGRAM REPORT: CPT

## 2025-08-15 PROCEDURE — 99222 1ST HOSP IP/OBS MODERATE 55: CPT

## 2025-08-15 DEVICE — SURGICEL SNOW 2 X 4": Type: IMPLANTABLE DEVICE | Status: FUNCTIONAL

## 2025-08-15 RX ORDER — DEXAMETHASONE 0.5 MG/1
4 TABLET ORAL EVERY 6 HOURS
Refills: 0 | Status: DISCONTINUED | OUTPATIENT
Start: 2025-08-15 | End: 2025-08-15

## 2025-08-15 RX ORDER — OXYCODONE HYDROCHLORIDE 30 MG/1
5 TABLET ORAL
Refills: 0 | Status: DISCONTINUED | OUTPATIENT
Start: 2025-08-15 | End: 2025-08-15

## 2025-08-15 RX ORDER — SODIUM CHLORIDE 9 G/1000ML
1000 INJECTION, SOLUTION INTRAVENOUS
Refills: 0 | Status: DISCONTINUED | OUTPATIENT
Start: 2025-08-15 | End: 2025-08-15

## 2025-08-15 RX ORDER — NALBUPHINE HYDROCHLORIDE 10 MG/ML
2.5 INJECTION INTRAMUSCULAR; INTRAVENOUS; SUBCUTANEOUS EVERY 6 HOURS
Refills: 0 | Status: DISCONTINUED | OUTPATIENT
Start: 2025-08-15 | End: 2025-08-15

## 2025-08-15 RX ORDER — OXYCODONE HYDROCHLORIDE 30 MG/1
5 TABLET ORAL
Refills: 0 | Status: COMPLETED | OUTPATIENT
Start: 2025-08-15 | End: 2025-08-22

## 2025-08-15 RX ORDER — ONDANSETRON HCL/PF 4 MG/2 ML
4 VIAL (ML) INJECTION EVERY 6 HOURS
Refills: 0 | Status: DISCONTINUED | OUTPATIENT
Start: 2025-08-15 | End: 2025-08-15

## 2025-08-15 RX ORDER — ACETAMINOPHEN 500 MG/5ML
975 LIQUID (ML) ORAL EVERY 6 HOURS
Refills: 0 | Status: DISCONTINUED | OUTPATIENT
Start: 2025-08-15 | End: 2025-08-15

## 2025-08-15 RX ORDER — DEXTROSE 50 % IN WATER 50 %
15 SYRINGE (ML) INTRAVENOUS ONCE
Refills: 0 | Status: DISCONTINUED | OUTPATIENT
Start: 2025-08-15 | End: 2025-08-21

## 2025-08-15 RX ORDER — SIMETHICONE 80 MG
80 TABLET,CHEWABLE ORAL EVERY 4 HOURS
Refills: 0 | Status: DISCONTINUED | OUTPATIENT
Start: 2025-08-15 | End: 2025-08-21

## 2025-08-15 RX ORDER — DEXTROSE 50 % IN WATER 50 %
25 SYRINGE (ML) INTRAVENOUS ONCE
Refills: 0 | Status: DISCONTINUED | OUTPATIENT
Start: 2025-08-15 | End: 2025-08-15

## 2025-08-15 RX ORDER — DEXTROSE 50 % IN WATER 50 %
15 SYRINGE (ML) INTRAVENOUS ONCE
Refills: 0 | Status: DISCONTINUED | OUTPATIENT
Start: 2025-08-15 | End: 2025-08-15

## 2025-08-15 RX ORDER — HEPARIN SODIUM 1000 [USP'U]/ML
5000 INJECTION INTRAVENOUS; SUBCUTANEOUS EVERY 12 HOURS
Refills: 0 | Status: DISCONTINUED | OUTPATIENT
Start: 2025-08-15 | End: 2025-08-21

## 2025-08-15 RX ORDER — DEXTROSE 50 % IN WATER 50 %
12.5 SYRINGE (ML) INTRAVENOUS ONCE
Refills: 0 | Status: DISCONTINUED | OUTPATIENT
Start: 2025-08-15 | End: 2025-08-21

## 2025-08-15 RX ORDER — SODIUM CHLORIDE 9 G/1000ML
1000 INJECTION, SOLUTION INTRAVENOUS
Refills: 0 | Status: DISCONTINUED | OUTPATIENT
Start: 2025-08-15 | End: 2025-08-16

## 2025-08-15 RX ORDER — OXYTOCIN-SODIUM CHLORIDE 0.9% IV SOLN 30 UNIT/500ML 30-0.9/5 UT/ML-%
42 SOLUTION INTRAVENOUS
Qty: 30 | Refills: 0 | Status: DISCONTINUED | OUTPATIENT
Start: 2025-08-15 | End: 2025-08-16

## 2025-08-15 RX ORDER — MAGNESIUM HYDROXIDE 400 MG/5ML
30 SUSPENSION ORAL
Refills: 0 | Status: DISCONTINUED | OUTPATIENT
Start: 2025-08-15 | End: 2025-08-21

## 2025-08-15 RX ORDER — CITRIC ACID/SODIUM CITRATE 300-500 MG
30 SOLUTION, ORAL ORAL ONCE
Refills: 0 | Status: COMPLETED | OUTPATIENT
Start: 2025-08-15 | End: 2025-08-15

## 2025-08-15 RX ORDER — INSULIN LISPRO 100 U/ML
INJECTION, SOLUTION INTRAVENOUS; SUBCUTANEOUS
Refills: 0 | Status: DISCONTINUED | OUTPATIENT
Start: 2025-08-15 | End: 2025-08-15

## 2025-08-15 RX ORDER — ACETAMINOPHEN 500 MG/5ML
1000 LIQUID (ML) ORAL ONCE
Refills: 0 | Status: COMPLETED | OUTPATIENT
Start: 2025-08-15 | End: 2025-08-15

## 2025-08-15 RX ORDER — DIPHENHYDRAMINE HCL 12.5MG/5ML
25 ELIXIR ORAL EVERY 6 HOURS
Refills: 0 | Status: DISCONTINUED | OUTPATIENT
Start: 2025-08-15 | End: 2025-08-21

## 2025-08-15 RX ORDER — DEXTROSE 50 % IN WATER 50 %
25 SYRINGE (ML) INTRAVENOUS ONCE
Refills: 0 | Status: DISCONTINUED | OUTPATIENT
Start: 2025-08-15 | End: 2025-08-21

## 2025-08-15 RX ORDER — GLUCAGON 3 MG/1
1 POWDER NASAL ONCE
Refills: 0 | Status: DISCONTINUED | OUTPATIENT
Start: 2025-08-15 | End: 2025-08-15

## 2025-08-15 RX ORDER — MODIFIED LANOLIN 100 %
1 CREAM (GRAM) TOPICAL EVERY 6 HOURS
Refills: 0 | Status: DISCONTINUED | OUTPATIENT
Start: 2025-08-15 | End: 2025-08-21

## 2025-08-15 RX ORDER — ACETAMINOPHEN 500 MG/5ML
1000 LIQUID (ML) ORAL EVERY 6 HOURS
Refills: 0 | Status: COMPLETED | OUTPATIENT
Start: 2025-08-15 | End: 2025-08-15

## 2025-08-15 RX ORDER — OXYCODONE HYDROCHLORIDE 30 MG/1
5 TABLET ORAL ONCE
Refills: 0 | Status: DISCONTINUED | OUTPATIENT
Start: 2025-08-15 | End: 2025-08-21

## 2025-08-15 RX ORDER — DEXTROSE 50 % IN WATER 50 %
12.5 SYRINGE (ML) INTRAVENOUS ONCE
Refills: 0 | Status: DISCONTINUED | OUTPATIENT
Start: 2025-08-15 | End: 2025-08-15

## 2025-08-15 RX ORDER — SODIUM CHLORIDE 9 G/1000ML
1000 INJECTION, SOLUTION INTRAVENOUS
Refills: 0 | Status: DISCONTINUED | OUTPATIENT
Start: 2025-08-15 | End: 2025-08-21

## 2025-08-15 RX ORDER — NALOXONE HYDROCHLORIDE 0.4 MG/ML
0.1 INJECTION, SOLUTION INTRAMUSCULAR; INTRAVENOUS; SUBCUTANEOUS
Refills: 0 | Status: DISCONTINUED | OUTPATIENT
Start: 2025-08-15 | End: 2025-08-15

## 2025-08-15 RX ORDER — INSULIN LISPRO 100 U/ML
INJECTION, SOLUTION INTRAVENOUS; SUBCUTANEOUS
Refills: 0 | Status: DISCONTINUED | OUTPATIENT
Start: 2025-08-15 | End: 2025-08-21

## 2025-08-15 RX ORDER — INSULIN LISPRO 100 U/ML
INJECTION, SOLUTION INTRAVENOUS; SUBCUTANEOUS AT BEDTIME
Refills: 0 | Status: DISCONTINUED | OUTPATIENT
Start: 2025-08-15 | End: 2025-08-21

## 2025-08-15 RX ORDER — ACETAMINOPHEN 500 MG/5ML
1000 LIQUID (ML) ORAL EVERY 6 HOURS
Refills: 0 | Status: DISCONTINUED | OUTPATIENT
Start: 2025-08-15 | End: 2025-08-15

## 2025-08-15 RX ORDER — NIFEDIPINE 30 MG
30 TABLET, EXTENDED RELEASE 24 HR ORAL DAILY
Refills: 0 | Status: DISCONTINUED | OUTPATIENT
Start: 2025-08-15 | End: 2025-08-20

## 2025-08-15 RX ORDER — MAGNESIUM SULFATE 500 MG/ML
2 SYRINGE (ML) INJECTION
Qty: 40 | Refills: 0 | Status: DISCONTINUED | OUTPATIENT
Start: 2025-08-15 | End: 2025-08-16

## 2025-08-15 RX ORDER — GLUCAGON 3 MG/1
1 POWDER NASAL ONCE
Refills: 0 | Status: DISCONTINUED | OUTPATIENT
Start: 2025-08-15 | End: 2025-08-21

## 2025-08-15 RX ADMIN — Medication 30 MILLILITER(S): at 02:27

## 2025-08-15 RX ADMIN — Medication 50 GM/HR: at 04:40

## 2025-08-15 RX ADMIN — OXYTOCIN-SODIUM CHLORIDE 0.9% IV SOLN 30 UNIT/500ML 42 MILLIUNIT(S)/MIN: 30-0.9/5 SOLUTION at 04:40

## 2025-08-15 RX ADMIN — Medication 1000 MILLIGRAM(S): at 07:28

## 2025-08-15 RX ADMIN — Medication 400 MILLIGRAM(S): at 11:13

## 2025-08-15 RX ADMIN — Medication 400 MILLIGRAM(S): at 05:39

## 2025-08-15 RX ADMIN — Medication 30 MILLIGRAM(S): at 18:27

## 2025-08-15 RX ADMIN — HEPARIN SODIUM 5000 UNIT(S): 1000 INJECTION INTRAVENOUS; SUBCUTANEOUS at 11:12

## 2025-08-15 RX ADMIN — INSULIN LISPRO 1: 100 INJECTION, SOLUTION INTRAVENOUS; SUBCUTANEOUS at 09:34

## 2025-08-15 RX ADMIN — Medication 1000 MILLIGRAM(S): at 11:25

## 2025-08-15 RX ADMIN — Medication 50 GM/HR: at 19:02

## 2025-08-15 RX ADMIN — Medication 50 GM/HR: at 07:18

## 2025-08-15 RX ADMIN — SODIUM CHLORIDE 125 MILLILITER(S): 9 INJECTION, SOLUTION INTRAVENOUS at 04:40

## 2025-08-15 RX ADMIN — Medication 400 MILLIGRAM(S): at 23:41

## 2025-08-15 RX ADMIN — Medication 50 GM/HR: at 10:44

## 2025-08-15 RX ADMIN — HEPARIN SODIUM 5000 UNIT(S): 1000 INJECTION INTRAVENOUS; SUBCUTANEOUS at 23:40

## 2025-08-15 RX ADMIN — INSULIN LISPRO 2: 100 INJECTION, SOLUTION INTRAVENOUS; SUBCUTANEOUS at 12:41

## 2025-08-15 RX ADMIN — Medication 400 MILLIGRAM(S): at 17:35

## 2025-08-15 RX ADMIN — Medication 20 MILLIGRAM(S): at 02:12

## 2025-08-15 RX ADMIN — Medication 1000 MILLIGRAM(S): at 06:00

## 2025-08-15 RX ADMIN — Medication 1000 MILLIGRAM(S): at 18:00

## 2025-08-16 LAB
ALBUMIN SERPL ELPH-MCNC: 2.6 G/DL — LOW (ref 3.3–5)
ALP SERPL-CCNC: 96 U/L — SIGNIFICANT CHANGE UP (ref 40–120)
ALT FLD-CCNC: 19 U/L — SIGNIFICANT CHANGE UP (ref 4–33)
ANION GAP SERPL CALC-SCNC: 12 MMOL/L — SIGNIFICANT CHANGE UP (ref 7–14)
AST SERPL-CCNC: 18 U/L — SIGNIFICANT CHANGE UP (ref 4–32)
BASOPHILS # BLD AUTO: 0.01 K/UL — SIGNIFICANT CHANGE UP (ref 0–0.2)
BASOPHILS NFR BLD AUTO: 0.1 % — SIGNIFICANT CHANGE UP (ref 0–2)
BILIRUB SERPL-MCNC: <0.2 MG/DL — SIGNIFICANT CHANGE UP (ref 0.2–1.2)
BUN SERPL-MCNC: 11 MG/DL — SIGNIFICANT CHANGE UP (ref 7–23)
CALCIUM SERPL-MCNC: 7.4 MG/DL — LOW (ref 8.4–10.5)
CHLORIDE SERPL-SCNC: 102 MMOL/L — SIGNIFICANT CHANGE UP (ref 98–107)
CO2 SERPL-SCNC: 22 MMOL/L — SIGNIFICANT CHANGE UP (ref 22–31)
CREAT SERPL-MCNC: 0.64 MG/DL — SIGNIFICANT CHANGE UP (ref 0.5–1.3)
EGFR: 117 ML/MIN/1.73M2 — SIGNIFICANT CHANGE UP
EGFR: 117 ML/MIN/1.73M2 — SIGNIFICANT CHANGE UP
EOSINOPHIL # BLD AUTO: 0.1 K/UL — SIGNIFICANT CHANGE UP (ref 0–0.5)
EOSINOPHIL NFR BLD AUTO: 0.9 % — SIGNIFICANT CHANGE UP (ref 0–6)
GLUCOSE BLDC GLUCOMTR-MCNC: 134 MG/DL — HIGH (ref 70–99)
GLUCOSE BLDC GLUCOMTR-MCNC: 158 MG/DL — HIGH (ref 70–99)
GLUCOSE BLDC GLUCOMTR-MCNC: 171 MG/DL — HIGH (ref 70–99)
GLUCOSE BLDC GLUCOMTR-MCNC: 219 MG/DL — HIGH (ref 70–99)
GLUCOSE SERPL-MCNC: 133 MG/DL — HIGH (ref 70–99)
HCT VFR BLD CALC: 28.7 % — LOW (ref 34.5–45)
HGB BLD-MCNC: 9.3 G/DL — LOW (ref 11.5–15.5)
IMM GRANULOCYTES # BLD AUTO: 0.09 K/UL — HIGH (ref 0–0.07)
IMM GRANULOCYTES NFR BLD AUTO: 0.8 % — SIGNIFICANT CHANGE UP (ref 0–0.9)
LDH SERPL L TO P-CCNC: 227 U/L — HIGH (ref 135–225)
LYMPHOCYTES # BLD AUTO: 1.92 K/UL — SIGNIFICANT CHANGE UP (ref 1–3.3)
LYMPHOCYTES NFR BLD AUTO: 16.6 % — SIGNIFICANT CHANGE UP (ref 13–44)
MCHC RBC-ENTMCNC: 28.3 PG — SIGNIFICANT CHANGE UP (ref 27–34)
MCHC RBC-ENTMCNC: 32.4 G/DL — SIGNIFICANT CHANGE UP (ref 32–36)
MCV RBC AUTO: 87.2 FL — SIGNIFICANT CHANGE UP (ref 80–100)
MONOCYTES # BLD AUTO: 0.86 K/UL — SIGNIFICANT CHANGE UP (ref 0–0.9)
MONOCYTES NFR BLD AUTO: 7.4 % — SIGNIFICANT CHANGE UP (ref 2–14)
NEUTROPHILS # BLD AUTO: 8.6 K/UL — HIGH (ref 1.8–7.4)
NEUTROPHILS NFR BLD AUTO: 74.2 % — SIGNIFICANT CHANGE UP (ref 43–77)
NRBC # BLD AUTO: 0 K/UL — SIGNIFICANT CHANGE UP (ref 0–0)
NRBC # FLD: 0 K/UL — SIGNIFICANT CHANGE UP (ref 0–0)
NRBC BLD AUTO-RTO: 0 /100 WBCS — SIGNIFICANT CHANGE UP (ref 0–0)
PLATELET # BLD AUTO: 216 K/UL — SIGNIFICANT CHANGE UP (ref 150–400)
PMV BLD: 11 FL — SIGNIFICANT CHANGE UP (ref 7–13)
POTASSIUM SERPL-MCNC: 3.7 MMOL/L — SIGNIFICANT CHANGE UP (ref 3.5–5.3)
POTASSIUM SERPL-SCNC: 3.7 MMOL/L — SIGNIFICANT CHANGE UP (ref 3.5–5.3)
PROT SERPL-MCNC: 5.9 G/DL — LOW (ref 6–8.3)
RBC # BLD: 3.29 M/UL — LOW (ref 3.8–5.2)
RBC # FLD: 14.8 % — HIGH (ref 10.3–14.5)
SODIUM SERPL-SCNC: 136 MMOL/L — SIGNIFICANT CHANGE UP (ref 135–145)
URATE SERPL-MCNC: 7.5 MG/DL — HIGH (ref 2.5–7)
WBC # BLD: 11.58 K/UL — HIGH (ref 3.8–10.5)
WBC # FLD AUTO: 11.58 K/UL — HIGH (ref 3.8–10.5)

## 2025-08-16 PROCEDURE — 99232 SBSQ HOSP IP/OBS MODERATE 35: CPT

## 2025-08-16 RX ORDER — METFORMIN HYDROCHLORIDE 850 MG/1
500 TABLET ORAL
Refills: 0 | Status: DISCONTINUED | OUTPATIENT
Start: 2025-08-16 | End: 2025-08-21

## 2025-08-16 RX ORDER — ACETAMINOPHEN 500 MG/5ML
1000 LIQUID (ML) ORAL EVERY 6 HOURS
Refills: 0 | Status: COMPLETED | OUTPATIENT
Start: 2025-08-16 | End: 2025-08-17

## 2025-08-16 RX ORDER — OXYCODONE HYDROCHLORIDE 30 MG/1
5 TABLET ORAL
Refills: 0 | Status: DISCONTINUED | OUTPATIENT
Start: 2025-08-16 | End: 2025-08-21

## 2025-08-16 RX ORDER — ACETAMINOPHEN 500 MG/5ML
1000 LIQUID (ML) ORAL ONCE
Refills: 0 | Status: COMPLETED | OUTPATIENT
Start: 2025-08-16 | End: 2025-08-16

## 2025-08-16 RX ADMIN — Medication 400 MILLIGRAM(S): at 09:00

## 2025-08-16 RX ADMIN — OXYCODONE HYDROCHLORIDE 5 MILLIGRAM(S): 30 TABLET ORAL at 06:45

## 2025-08-16 RX ADMIN — Medication 20 MILLIGRAM(S): at 09:36

## 2025-08-16 RX ADMIN — INSULIN LISPRO 2: 100 INJECTION, SOLUTION INTRAVENOUS; SUBCUTANEOUS at 12:01

## 2025-08-16 RX ADMIN — Medication 1000 MILLIGRAM(S): at 00:10

## 2025-08-16 RX ADMIN — OXYCODONE HYDROCHLORIDE 5 MILLIGRAM(S): 30 TABLET ORAL at 06:15

## 2025-08-16 RX ADMIN — Medication 400 MILLIGRAM(S): at 14:45

## 2025-08-16 RX ADMIN — Medication 1000 MILLIGRAM(S): at 09:30

## 2025-08-16 RX ADMIN — METFORMIN HYDROCHLORIDE 500 MILLIGRAM(S): 850 TABLET ORAL at 22:24

## 2025-08-16 RX ADMIN — Medication 400 MILLIGRAM(S): at 21:28

## 2025-08-16 RX ADMIN — HEPARIN SODIUM 5000 UNIT(S): 1000 INJECTION INTRAVENOUS; SUBCUTANEOUS at 22:25

## 2025-08-16 RX ADMIN — INSULIN LISPRO 1: 100 INJECTION, SOLUTION INTRAVENOUS; SUBCUTANEOUS at 09:00

## 2025-08-16 RX ADMIN — HEPARIN SODIUM 5000 UNIT(S): 1000 INJECTION INTRAVENOUS; SUBCUTANEOUS at 09:35

## 2025-08-16 RX ADMIN — Medication 1000 MILLIGRAM(S): at 21:43

## 2025-08-16 RX ADMIN — Medication 30 MILLIGRAM(S): at 17:35

## 2025-08-16 RX ADMIN — Medication 1000 MILLIGRAM(S): at 17:15

## 2025-08-16 RX ADMIN — MAGNESIUM HYDROXIDE 30 MILLILITER(S): 400 SUSPENSION ORAL at 22:32

## 2025-08-17 LAB
GLUCOSE BLDC GLUCOMTR-MCNC: 123 MG/DL — HIGH (ref 70–99)
GLUCOSE BLDC GLUCOMTR-MCNC: 127 MG/DL — HIGH (ref 70–99)
GLUCOSE BLDC GLUCOMTR-MCNC: 160 MG/DL — HIGH (ref 70–99)
GLUCOSE BLDC GLUCOMTR-MCNC: 181 MG/DL — HIGH (ref 70–99)
GLUCOSE BLDC GLUCOMTR-MCNC: 200 MG/DL — HIGH (ref 70–99)
GLUCOSE BLDC GLUCOMTR-MCNC: 250 MG/DL — HIGH (ref 70–99)

## 2025-08-17 RX ORDER — ACETAMINOPHEN 500 MG/5ML
975 LIQUID (ML) ORAL EVERY 6 HOURS
Refills: 0 | Status: COMPLETED | OUTPATIENT
Start: 2025-08-17 | End: 2026-07-16

## 2025-08-17 RX ORDER — FERROUS SULFATE 137(45) MG
325 TABLET, EXTENDED RELEASE ORAL DAILY
Refills: 0 | Status: DISCONTINUED | OUTPATIENT
Start: 2025-08-17 | End: 2025-08-21

## 2025-08-17 RX ORDER — ACETAMINOPHEN 500 MG/5ML
975 LIQUID (ML) ORAL EVERY 6 HOURS
Refills: 0 | Status: DISCONTINUED | OUTPATIENT
Start: 2025-08-17 | End: 2025-08-21

## 2025-08-17 RX ORDER — PRENATAL 136/IRON/FOLIC ACID 27 MG-1 MG
1 TABLET ORAL DAILY
Refills: 0 | Status: DISCONTINUED | OUTPATIENT
Start: 2025-08-17 | End: 2025-08-21

## 2025-08-17 RX ORDER — SENNA 187 MG
2 TABLET ORAL AT BEDTIME
Refills: 0 | Status: DISCONTINUED | OUTPATIENT
Start: 2025-08-17 | End: 2025-08-21

## 2025-08-17 RX ADMIN — HEPARIN SODIUM 5000 UNIT(S): 1000 INJECTION INTRAVENOUS; SUBCUTANEOUS at 09:23

## 2025-08-17 RX ADMIN — Medication 1000 MILLIGRAM(S): at 14:29

## 2025-08-17 RX ADMIN — MAGNESIUM HYDROXIDE 30 MILLILITER(S): 400 SUSPENSION ORAL at 20:47

## 2025-08-17 RX ADMIN — Medication 2 TABLET(S): at 23:06

## 2025-08-17 RX ADMIN — HEPARIN SODIUM 5000 UNIT(S): 1000 INJECTION INTRAVENOUS; SUBCUTANEOUS at 18:43

## 2025-08-17 RX ADMIN — Medication 975 MILLIGRAM(S): at 15:29

## 2025-08-17 RX ADMIN — METFORMIN HYDROCHLORIDE 500 MILLIGRAM(S): 850 TABLET ORAL at 23:06

## 2025-08-17 RX ADMIN — Medication 400 MILLIGRAM(S): at 03:22

## 2025-08-17 RX ADMIN — Medication 975 MILLIGRAM(S): at 21:45

## 2025-08-17 RX ADMIN — Medication 30 MILLIGRAM(S): at 18:39

## 2025-08-17 RX ADMIN — Medication 975 MILLIGRAM(S): at 20:47

## 2025-08-17 RX ADMIN — METFORMIN HYDROCHLORIDE 500 MILLIGRAM(S): 850 TABLET ORAL at 09:22

## 2025-08-17 RX ADMIN — Medication 1000 MILLIGRAM(S): at 03:27

## 2025-08-17 RX ADMIN — INSULIN LISPRO 1: 100 INJECTION, SOLUTION INTRAVENOUS; SUBCUTANEOUS at 14:37

## 2025-08-17 RX ADMIN — Medication 400 MILLIGRAM(S): at 09:32

## 2025-08-17 RX ADMIN — Medication 975 MILLIGRAM(S): at 18:29

## 2025-08-18 LAB
APPEARANCE UR: ABNORMAL
BACTERIA # UR AUTO: ABNORMAL /HPF
BILIRUB UR-MCNC: NEGATIVE — SIGNIFICANT CHANGE UP
CAST: 0 /LPF — SIGNIFICANT CHANGE UP (ref 0–4)
COLOR SPEC: YELLOW — SIGNIFICANT CHANGE UP
DIFF PNL FLD: ABNORMAL
GLUCOSE BLDC GLUCOMTR-MCNC: 147 MG/DL — HIGH (ref 70–99)
GLUCOSE BLDC GLUCOMTR-MCNC: 156 MG/DL — HIGH (ref 70–99)
GLUCOSE BLDC GLUCOMTR-MCNC: 175 MG/DL — HIGH (ref 70–99)
GLUCOSE BLDC GLUCOMTR-MCNC: 190 MG/DL — HIGH (ref 70–99)
GLUCOSE UR QL: NEGATIVE MG/DL — SIGNIFICANT CHANGE UP
KETONES UR QL: NEGATIVE MG/DL — SIGNIFICANT CHANGE UP
LEUKOCYTE ESTERASE UR-ACNC: ABNORMAL
NITRITE UR-MCNC: NEGATIVE — SIGNIFICANT CHANGE UP
PH UR: 7 — SIGNIFICANT CHANGE UP (ref 5–8)
PROT UR-MCNC: SIGNIFICANT CHANGE UP MG/DL
RBC CASTS # UR COMP ASSIST: 315 /HPF — HIGH (ref 0–4)
REVIEW: SIGNIFICANT CHANGE UP
SP GR SPEC: 1.01 — SIGNIFICANT CHANGE UP (ref 1–1.03)
SQUAMOUS # UR AUTO: 7 /HPF — HIGH (ref 0–5)
UROBILINOGEN FLD QL: 0.2 MG/DL — SIGNIFICANT CHANGE UP (ref 0.2–1)
WBC UR QL: 51 /HPF — HIGH (ref 0–5)

## 2025-08-18 PROCEDURE — 99232 SBSQ HOSP IP/OBS MODERATE 35: CPT

## 2025-08-18 RX ADMIN — Medication 2 TABLET(S): at 23:03

## 2025-08-18 RX ADMIN — Medication 975 MILLIGRAM(S): at 22:30

## 2025-08-18 RX ADMIN — INSULIN LISPRO 1: 100 INJECTION, SOLUTION INTRAVENOUS; SUBCUTANEOUS at 21:34

## 2025-08-18 RX ADMIN — Medication 1 TABLET(S): at 09:50

## 2025-08-18 RX ADMIN — Medication 975 MILLIGRAM(S): at 16:18

## 2025-08-18 RX ADMIN — Medication 975 MILLIGRAM(S): at 04:00

## 2025-08-18 RX ADMIN — Medication 30 MILLIGRAM(S): at 18:25

## 2025-08-18 RX ADMIN — HEPARIN SODIUM 5000 UNIT(S): 1000 INJECTION INTRAVENOUS; SUBCUTANEOUS at 23:03

## 2025-08-18 RX ADMIN — Medication 975 MILLIGRAM(S): at 10:51

## 2025-08-18 RX ADMIN — Medication 20 MILLIGRAM(S): at 09:50

## 2025-08-18 RX ADMIN — Medication 975 MILLIGRAM(S): at 21:36

## 2025-08-18 RX ADMIN — Medication 975 MILLIGRAM(S): at 16:48

## 2025-08-18 RX ADMIN — Medication 975 MILLIGRAM(S): at 11:21

## 2025-08-18 RX ADMIN — METFORMIN HYDROCHLORIDE 500 MILLIGRAM(S): 850 TABLET ORAL at 09:51

## 2025-08-18 RX ADMIN — HEPARIN SODIUM 5000 UNIT(S): 1000 INJECTION INTRAVENOUS; SUBCUTANEOUS at 10:11

## 2025-08-18 RX ADMIN — Medication 500 MILLIGRAM(S): at 10:11

## 2025-08-18 RX ADMIN — METFORMIN HYDROCHLORIDE 500 MILLIGRAM(S): 850 TABLET ORAL at 23:03

## 2025-08-18 RX ADMIN — INSULIN LISPRO 1: 100 INJECTION, SOLUTION INTRAVENOUS; SUBCUTANEOUS at 12:44

## 2025-08-18 RX ADMIN — Medication 975 MILLIGRAM(S): at 03:09

## 2025-08-19 LAB
GLUCOSE BLDC GLUCOMTR-MCNC: 123 MG/DL — HIGH (ref 70–99)
GLUCOSE BLDC GLUCOMTR-MCNC: 134 MG/DL — HIGH (ref 70–99)
GLUCOSE BLDC GLUCOMTR-MCNC: 149 MG/DL — HIGH (ref 70–99)
GLUCOSE BLDC GLUCOMTR-MCNC: 159 MG/DL — HIGH (ref 70–99)

## 2025-08-19 PROCEDURE — 99232 SBSQ HOSP IP/OBS MODERATE 35: CPT

## 2025-08-19 RX ORDER — METFORMIN HYDROCHLORIDE 850 MG/1
2 TABLET ORAL
Qty: 120 | Refills: 0
Start: 2025-08-19 | End: 2025-09-17

## 2025-08-19 RX ORDER — NIFEDIPINE 30 MG
30 TABLET, EXTENDED RELEASE 24 HR ORAL ONCE
Refills: 0 | Status: COMPLETED | OUTPATIENT
Start: 2025-08-19 | End: 2025-08-19

## 2025-08-19 RX ORDER — ACETAMINOPHEN 500 MG/5ML
3 LIQUID (ML) ORAL
Qty: 0 | Refills: 0 | DISCHARGE
Start: 2025-08-19

## 2025-08-19 RX ORDER — NIFEDIPINE 30 MG
1 TABLET, EXTENDED RELEASE 24 HR ORAL
Qty: 30 | Refills: 0
Start: 2025-08-19 | End: 2025-09-17

## 2025-08-19 RX ADMIN — Medication 975 MILLIGRAM(S): at 22:12

## 2025-08-19 RX ADMIN — Medication 975 MILLIGRAM(S): at 10:18

## 2025-08-19 RX ADMIN — Medication 975 MILLIGRAM(S): at 02:42

## 2025-08-19 RX ADMIN — Medication 20 MILLIGRAM(S): at 10:15

## 2025-08-19 RX ADMIN — Medication 30 MILLIGRAM(S): at 18:39

## 2025-08-19 RX ADMIN — Medication 975 MILLIGRAM(S): at 16:30

## 2025-08-19 RX ADMIN — METFORMIN HYDROCHLORIDE 500 MILLIGRAM(S): 850 TABLET ORAL at 10:23

## 2025-08-19 RX ADMIN — Medication 975 MILLIGRAM(S): at 17:00

## 2025-08-19 RX ADMIN — Medication 975 MILLIGRAM(S): at 10:48

## 2025-08-19 RX ADMIN — Medication 30 MILLIGRAM(S): at 23:34

## 2025-08-19 RX ADMIN — METFORMIN HYDROCHLORIDE 500 MILLIGRAM(S): 850 TABLET ORAL at 18:39

## 2025-08-19 RX ADMIN — Medication 975 MILLIGRAM(S): at 21:42

## 2025-08-19 RX ADMIN — Medication 1 TABLET(S): at 16:28

## 2025-08-19 RX ADMIN — Medication 500 MILLIGRAM(S): at 16:28

## 2025-08-19 RX ADMIN — HEPARIN SODIUM 5000 UNIT(S): 1000 INJECTION INTRAVENOUS; SUBCUTANEOUS at 23:34

## 2025-08-19 RX ADMIN — INSULIN LISPRO 1: 100 INJECTION, SOLUTION INTRAVENOUS; SUBCUTANEOUS at 12:50

## 2025-08-19 RX ADMIN — Medication 975 MILLIGRAM(S): at 03:42

## 2025-08-20 LAB
CULTURE RESULTS: SIGNIFICANT CHANGE UP
GLUCOSE BLDC GLUCOMTR-MCNC: 132 MG/DL — HIGH (ref 70–99)
GLUCOSE BLDC GLUCOMTR-MCNC: 140 MG/DL — HIGH (ref 70–99)
GLUCOSE BLDC GLUCOMTR-MCNC: 156 MG/DL — HIGH (ref 70–99)
GLUCOSE BLDC GLUCOMTR-MCNC: 208 MG/DL — HIGH (ref 70–99)
SPECIMEN SOURCE: SIGNIFICANT CHANGE UP

## 2025-08-20 RX ORDER — HYDROCORTISONE 10 MG/G
1 CREAM TOPICAL EVERY 6 HOURS
Refills: 0 | Status: DISCONTINUED | OUTPATIENT
Start: 2025-08-20 | End: 2025-08-21

## 2025-08-20 RX ORDER — NIFEDIPINE 30 MG
60 TABLET, EXTENDED RELEASE 24 HR ORAL DAILY
Refills: 0 | Status: DISCONTINUED | OUTPATIENT
Start: 2025-08-20 | End: 2025-08-20

## 2025-08-20 RX ORDER — NIFEDIPINE 30 MG
60 TABLET, EXTENDED RELEASE 24 HR ORAL DAILY
Refills: 0 | Status: DISCONTINUED | OUTPATIENT
Start: 2025-08-20 | End: 2025-08-21

## 2025-08-20 RX ADMIN — Medication 20 MILLIGRAM(S): at 08:40

## 2025-08-20 RX ADMIN — HEPARIN SODIUM 5000 UNIT(S): 1000 INJECTION INTRAVENOUS; SUBCUTANEOUS at 22:29

## 2025-08-20 RX ADMIN — INSULIN LISPRO 1: 100 INJECTION, SOLUTION INTRAVENOUS; SUBCUTANEOUS at 17:38

## 2025-08-20 RX ADMIN — Medication 975 MILLIGRAM(S): at 17:13

## 2025-08-20 RX ADMIN — Medication 1 TABLET(S): at 13:23

## 2025-08-20 RX ADMIN — Medication 975 MILLIGRAM(S): at 04:24

## 2025-08-20 RX ADMIN — Medication 60 MILLIGRAM(S): at 18:39

## 2025-08-20 RX ADMIN — Medication 975 MILLIGRAM(S): at 22:28

## 2025-08-20 RX ADMIN — Medication 975 MILLIGRAM(S): at 16:13

## 2025-08-20 RX ADMIN — HEPARIN SODIUM 5000 UNIT(S): 1000 INJECTION INTRAVENOUS; SUBCUTANEOUS at 10:03

## 2025-08-20 RX ADMIN — METFORMIN HYDROCHLORIDE 500 MILLIGRAM(S): 850 TABLET ORAL at 08:40

## 2025-08-20 RX ADMIN — METFORMIN HYDROCHLORIDE 500 MILLIGRAM(S): 850 TABLET ORAL at 22:29

## 2025-08-20 RX ADMIN — Medication 975 MILLIGRAM(S): at 22:58

## 2025-08-20 RX ADMIN — Medication 975 MILLIGRAM(S): at 09:03

## 2025-08-20 RX ADMIN — Medication 975 MILLIGRAM(S): at 03:54

## 2025-08-20 RX ADMIN — HYDROCORTISONE 1 APPLICATION(S): 10 CREAM TOPICAL at 16:25

## 2025-08-20 RX ADMIN — Medication 975 MILLIGRAM(S): at 10:00

## 2025-08-21 VITALS
SYSTOLIC BLOOD PRESSURE: 114 MMHG | HEART RATE: 103 BPM | OXYGEN SATURATION: 98 % | RESPIRATION RATE: 17 BRPM | DIASTOLIC BLOOD PRESSURE: 86 MMHG | TEMPERATURE: 98 F

## 2025-08-21 LAB
GLUCOSE BLDC GLUCOMTR-MCNC: 135 MG/DL — HIGH (ref 70–99)
GLUCOSE BLDC GLUCOMTR-MCNC: 150 MG/DL — HIGH (ref 70–99)

## 2025-08-21 PROCEDURE — 99232 SBSQ HOSP IP/OBS MODERATE 35: CPT

## 2025-08-21 RX ORDER — NIFEDIPINE 30 MG
1 TABLET, EXTENDED RELEASE 24 HR ORAL
Qty: 30 | Refills: 0
Start: 2025-08-21 | End: 2025-09-19

## 2025-08-21 RX ORDER — DIPHENHYDRAMINE HCL 12.5MG/5ML
25 ELIXIR ORAL EVERY 4 HOURS
Refills: 0 | Status: DISCONTINUED | OUTPATIENT
Start: 2025-08-21 | End: 2025-08-21

## 2025-08-21 RX ADMIN — Medication 975 MILLIGRAM(S): at 10:33

## 2025-08-21 RX ADMIN — Medication 20 MILLIGRAM(S): at 10:03

## 2025-08-21 RX ADMIN — Medication 975 MILLIGRAM(S): at 10:03

## 2025-08-21 RX ADMIN — Medication 975 MILLIGRAM(S): at 04:36

## 2025-08-21 RX ADMIN — Medication 1 TABLET(S): at 10:03

## 2025-08-21 RX ADMIN — Medication 25 MILLIGRAM(S): at 05:39

## 2025-08-21 RX ADMIN — Medication 975 MILLIGRAM(S): at 04:06

## 2025-08-21 RX ADMIN — METFORMIN HYDROCHLORIDE 500 MILLIGRAM(S): 850 TABLET ORAL at 10:03

## 2025-08-21 RX ADMIN — Medication 975 MILLIGRAM(S): at 15:54

## 2025-08-22 PROBLEM — F41.9 ANXIETY DISORDER, UNSPECIFIED: Chronic | Status: ACTIVE | Noted: 2025-08-07

## 2025-08-22 LAB
CULTURE RESULTS: SIGNIFICANT CHANGE UP
SPECIMEN SOURCE: SIGNIFICANT CHANGE UP

## 2025-08-25 ENCOUNTER — APPOINTMENT (OUTPATIENT)
Dept: OBGYN | Facility: CLINIC | Age: 35
End: 2025-08-25
Payer: COMMERCIAL

## 2025-08-25 PROCEDURE — 99211 OFF/OP EST MAY X REQ PHY/QHP: CPT

## 2025-09-09 ENCOUNTER — APPOINTMENT (OUTPATIENT)
Dept: OBGYN | Facility: CLINIC | Age: 35
End: 2025-09-09